# Patient Record
Sex: MALE | Race: WHITE | Employment: FULL TIME | URBAN - METROPOLITAN AREA
[De-identification: names, ages, dates, MRNs, and addresses within clinical notes are randomized per-mention and may not be internally consistent; named-entity substitution may affect disease eponyms.]

---

## 2021-03-29 NOTE — PROGRESS NOTES
[facility]  [address], [address]  Phone: [phone]  Fax: [phone]        INITIAL CONSULTATION      HISTORY OF PRESENT ILLNESS:  Amirah Abdul is a 41 y.o. male whom is referred from [facility] pain management secondary to diffuse widespread spinal pain radiating into the bilateral shoulder blades and RUE to the wrist and RLE to the knee. He rates his pain 5-10/10. Pt had onset of lower back pain x 2001 and neck pain x 2011. Onset of neck pain correlated with a cervical vertebrae compression fracture dx by chiropractor. Pt previously tolerated Neurontin 200 mg QD. He discontinued the Neurontin due to improvement in his pain. He has treated with Prednisone x 2 (most recent 1/2021). Pt previously underwent cervical spinal injections with benefit. Pt previously underwent  lumbar blocks. Most recent lumbar block was in 10/2020 by Dr. Paula Bee. Pt reports current right rotator cuff tear. Patient denies previous spinal surgery or recent physical therapy/chiropractic care. He is not on a HEP. Pt reports his weight has fluctuated between 215-235 within 1 week. Pt denies fever or skin changes. Pt was previously followed by [facility] pain management. Pt is a smoker. PmHx of right hip labral repair, cirrhosis of the liver. Note from Ahmet Daltonma dated 4/25/2019 indicating patient was seen with c/o low back pain, neck pain, shoulder pain. Pt underwent lumbar epidural 4/14/2019 with 50% relief. Treated with Mobic and Flexeril. Pt underwent cervical epidural with 50% relief. C spine MRI dated 10/15/2018 films not independently reviewed. Per report, there is mild to moderate broad based posterior disc osteophyte complex with is asymmetrically prominent on the right. The disc osteophyte complex is most promininently demonstrated in the righ tlateral to far lateral plane.  There is effacement of cerebral spinal fluid anterior to the spinal cord and lateral to the spinal cord and lateral to the spinal cord on the right due to the disc osteophyte complex. There is no convincing evidence of spinal cord deformation. There is severe right neural foraminal narrowing due to the disc osteophyte complex. there is suspected contact of the exiting right C5 nerve root. Left neural foramina is patent. L spine MRI dated 10/15/2018 films not independently reviewed. Per report, there is a small broad based disc bulge noted int he left paracentral plane there is mild indentation of the anterior aspect of the thecal sac on the left. There is mild left lateral recess narrowing without evidence of contact of the transversing left L2 nerve root. L2-3: there is a mild broad based posterior disc bulge. There is superimposed focal disc bulge noted in the left paracentral plane which extends superiorly. There is indentation on the anterior aspect sac. There is left lateral recess narrowing with suspected contact of the transvering left L3 nerve root. The right lateral recess is mildly narrowed due to a broad based posterior disc bulge. There is no evidence of contact of the transversing right L3 nerve root.  reviewed. Body mass index is 34.67 kg/m². PCP: UNKNOWN    Past Medical History:   Diagnosis Date    Cirrhosis of liver ([address]     GERD (gastroesophageal reflux disease)    HX O  Past Surgical History:   Procedure Laterality Date    HAND/FINGER SURGERY UNLISTED      HX CHOLECYSTECTOMY      HX OTHER SURGICAL      right hip labral repair    HX VASECTOMY     THER SURGICAL      right hip labral repair    HX VASECTOMY          Tobacco Use    Smoking status: Current Every Day Smoker     Packs/day: 1.00    Smokeless tobacco: Never Used   Substance Use Topics    Alcohol use: Not Currently       Work status: The patient is employed. Marital status: .          No Known Allergies         Family History   Problem Relation Age of Onset    Diabetes Father     Stroke Father     Cancer Brother          REVIEW OF SYSTEMS  Constitutional symptoms: Negative  Eyes: Negative  Ears, Nose, Throat, and Mouth: Negative  Cardiovascular: Negative  Respiratory: Negative  Genitourinary: Negative  Integumentary (Skin and/or breast): Negative  Musculoskeletal: Positive for diffuse widespread pain. Extremities: Negative for edema. Endocrine/Rheumatologic: Negative  Hematologic/Lymphatic: Negative  Allergic/Immunologic: Negative  Psychiatric: Negative       PHYSICAL EXAMINATION  Visit Vitals  /86 (BP 1 Location: Left upper arm)   Pulse 69   Temp 98.3 °F (36.8 °C)   Resp 18   Ht 5' 8\" (1.727 m)   Wt 228 lb (103.4 kg)   SpO2 99%   BMI 34.67 kg/m²       CONSTITUTIONAL: NAD, A&O x 3  HEART: Regular rate and rhythm  GASTROINTESTINAL: Positive bowel sounds, soft, nontender, and nondistended  LUNGS: Clear to auscultation bilaterally. SKIN: Negative for rash. RANGE OF MOTION: The patient has full passive range of motion in all four extremities. SENSATION: Decreased sensation to light touch on the 1st digit of the LUE. Otherwise, sensation is intact to light touch throughout. MOTOR:   Straight Leg Raise: Negative, bilateral  Martell: Negative, bilateral  Tandem Gait: Neg. Deep tendon reflexes are 0 at the biceps, triceps, and brachioradialis bilaterally. Deep tendon reflexes are 1 at the knees bilaterally and 0 on the RLE and 1 on the LLE at the ankles. Shoulder AB/Flex Elbow Flex Wrist Ext Elbow Ext Wrist Flex Hand Intrin Tone   Right +4/5 +4/5 +4/5 +4/5 +4/5 +4/5 +4/5   Left +4/5 +4/5 +4/5 +4/5 +4/5 +4/5 +4/5              Hip Flex Knee Ext Knee Flex Ankle DF GTE Ankle PF Tone   Right +4/5 +4/5 +4/5 +4/5 +4/5 +4/5 +4/5   Left +4/5 +4/5 +4/5 +4/5 +4/5 +4/5 +4/5     RADIOGRAPHS  Cervical spine plain films dated 3/30/2021. 2 views: AP and lateral. Revealed:  Mild disc space narrowing at C5-6. Small anterior osteophytes noted at C3, C4, C5 and C6. No malalignment. No acute pathology identified. Thoracic spine plain films dated 3/30/2021. 2 views: AP and lateral. Revealed:  Bridging osteophytes noted particularly in the mid to lower thoracic spine. Mild to moderate disc space narrowing at several levels. Exagerted kyphosis. No acute pathology identified. Lumbar spine plain films dated 3/30/2021. 2 views: AP and lateral. Revealed:  Mild disc space narrowing at L1-2, L2-3 and L5-S1. No malalignment. No acute pathology identified. ASSESSMENT   Diagnoses and all orders for this visit:    1. Neck pain  -     AMB POC XRAY, SPINE, CERVICAL; 2 OR 3    2. Thoracic spine pain  -     AMB POC XRAY, SPINE; THORACIC, 2 VIEW    3. Low back pain at multiple sites  -     AMB POC XRAY, SPINE, LUMBOSACRAL; 2 O    4. Cervical spondylosis without myelopathy    5. Cervical neuritis    6. DDD (degenerative disc disease), cervical    7. Thoracic spondylosis without myelopathy    8. DDD (degenerative disc disease), thoracic    9. Lumbar neuritis    10. DDD (degenerative disc disease), lumbar    Administrations This Visit     ketorolac (TORADOL) injection 30 mg     Admin Date  03/30/2021  11:50 Action  Given Dose  30 mg Route  IntraMUSCular Site  Right Gluteus Lenin Administered By  Tatiana Langford LPN    NDC: 43286-955-55    Patient Supplied?: No           Admin Date  03/30/2021  11:51 Action  Given Dose  30 mg Route  IntraMUSCular Site  Right Gluteus Lenin Administered By  Tatiana Langford LPN    NDC: 58420-900-32    Patient Supplied?: No                     IMPRESSIONS/RECOMMENDATIONS:  Patient presents today with c/o diffuse widespread spinal pain radiating into the bilateral shoulder blades and RUE to the wrist and RLE to the knee. Multiple treatment options were discussed. Pt is not interested in surgical intervention at this time. Pt received an in office Toradol injection. I will restart him on Neurontin 100 mg TID. Patient advised to call the office if intolerant to medication.  I will refer him to physical therapy with an emphasis on HEP. I will have the patient sign a release of medical information to obtain records from Dr. Keith Bond. Patient is neurologically intact. I will see the patient back in 6 week's time or earlier if needed. Written by Glo Roman, as dictated by Michelle Holt MD  I examined the patient, reviewed and agree with the note.

## 2021-03-30 ENCOUNTER — OFFICE VISIT (OUTPATIENT)
Dept: ORTHOPEDIC SURGERY | Age: 46
End: 2021-03-30
Payer: COMMERCIAL

## 2021-03-30 VITALS
HEART RATE: 69 BPM | RESPIRATION RATE: 18 BRPM | WEIGHT: 228 LBS | DIASTOLIC BLOOD PRESSURE: 86 MMHG | TEMPERATURE: 98.3 F | SYSTOLIC BLOOD PRESSURE: 126 MMHG | HEIGHT: 68 IN | BODY MASS INDEX: 34.56 KG/M2 | OXYGEN SATURATION: 99 %

## 2021-03-30 DIAGNOSIS — M47.812 CERVICAL SPONDYLOSIS WITHOUT MYELOPATHY: ICD-10-CM

## 2021-03-30 DIAGNOSIS — M54.6 THORACIC SPINE PAIN: ICD-10-CM

## 2021-03-30 DIAGNOSIS — M47.814 THORACIC SPONDYLOSIS WITHOUT MYELOPATHY: ICD-10-CM

## 2021-03-30 DIAGNOSIS — M51.36 DDD (DEGENERATIVE DISC DISEASE), LUMBAR: ICD-10-CM

## 2021-03-30 DIAGNOSIS — M50.30 DDD (DEGENERATIVE DISC DISEASE), CERVICAL: ICD-10-CM

## 2021-03-30 DIAGNOSIS — M54.16 LUMBAR NEURITIS: ICD-10-CM

## 2021-03-30 DIAGNOSIS — M54.2 NECK PAIN: Primary | ICD-10-CM

## 2021-03-30 DIAGNOSIS — M51.34 DDD (DEGENERATIVE DISC DISEASE), THORACIC: ICD-10-CM

## 2021-03-30 DIAGNOSIS — M54.50 LOW BACK PAIN AT MULTIPLE SITES: ICD-10-CM

## 2021-03-30 DIAGNOSIS — M54.12 CERVICAL NEURITIS: ICD-10-CM

## 2021-03-30 PROCEDURE — 72070 X-RAY EXAM THORAC SPINE 2VWS: CPT | Performed by: PHYSICAL MEDICINE & REHABILITATION

## 2021-03-30 PROCEDURE — 99204 OFFICE O/P NEW MOD 45 MIN: CPT | Performed by: PHYSICAL MEDICINE & REHABILITATION

## 2021-03-30 PROCEDURE — 96372 THER/PROPH/DIAG INJ SC/IM: CPT | Performed by: PHYSICAL MEDICINE & REHABILITATION

## 2021-03-30 PROCEDURE — 72040 X-RAY EXAM NECK SPINE 2-3 VW: CPT | Performed by: PHYSICAL MEDICINE & REHABILITATION

## 2021-03-30 PROCEDURE — 72100 X-RAY EXAM L-S SPINE 2/3 VWS: CPT | Performed by: PHYSICAL MEDICINE & REHABILITATION

## 2021-03-30 RX ORDER — KETOROLAC TROMETHAMINE 30 MG/ML
30 INJECTION, SOLUTION INTRAMUSCULAR; INTRAVENOUS
Status: COMPLETED | OUTPATIENT
Start: 2021-03-30 | End: 2021-03-30

## 2021-03-30 RX ORDER — GABAPENTIN 100 MG/1
100 CAPSULE ORAL 3 TIMES DAILY
Qty: 90 CAP | Refills: 1 | Status: SHIPPED | OUTPATIENT
Start: 2021-03-30 | End: 2021-05-11 | Stop reason: ALTCHOICE

## 2021-03-30 RX ORDER — OMEPRAZOLE 40 MG/1
40 CAPSULE, DELAYED RELEASE ORAL DAILY
COMMUNITY

## 2021-03-30 RX ADMIN — KETOROLAC TROMETHAMINE 30 MG: 30 INJECTION, SOLUTION INTRAMUSCULAR; INTRAVENOUS at 11:51

## 2021-03-30 RX ADMIN — KETOROLAC TROMETHAMINE 30 MG: 30 INJECTION, SOLUTION INTRAMUSCULAR; INTRAVENOUS at 11:50

## 2021-04-09 ENCOUNTER — HOSPITAL ENCOUNTER (OUTPATIENT)
Dept: PHYSICAL THERAPY | Age: 46
Discharge: HOME | End: 2021-04-09
Attending: PHYSICAL MEDICINE & REHABILITATION
Payer: COMMERCIAL

## 2021-04-09 PROCEDURE — 97110 THERAPEUTIC EXERCISES: CPT | Performed by: PHYSICAL THERAPIST

## 2021-04-09 PROCEDURE — 97140 MANUAL THERAPY 1/> REGIONS: CPT | Performed by: PHYSICAL THERAPIST

## 2021-04-09 PROCEDURE — 97162 PT EVAL MOD COMPLEX 30 MIN: CPT | Performed by: PHYSICAL THERAPIST

## 2021-04-09 NOTE — PROGRESS NOTES
[facility] - [facility]              [address]        [place], [address]   [phone] fax    Plan of Care/ Statement of Necessity for Physical Therapy Services  Patient name: Chance Gomez Start of Care: 2021   Referral source: Deja Sams MD : 1975    Medical Diagnosis: Cervicalgia [M54.2]  Low back pain [M54.5]  Pain in thoracic spine [M54.6]  Payor: Gaurang Oconnor / Plan: [address] [place] [address] / Product Type: PPO /  Onset Date:2021    Treatment Diagnosis: Neck Pain   Prior Hospitalization: see medical history Provider#: [id number]   Medications: Verified on Patient summary List    Comorbidities: Arthritis, Back Pain, BMI 31.6, GI Disease, Headaches, HBP, Sleep dysfunction. 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 The Plan of Care and following information is based on the information from the initial evaluation. Assessment/ key information: Patient with signs and symptoms consistent with acute exacerbation of chronic neck  and back pain. At this point, his neck symptoms are the worst.  He notes constant pain in the neck and right>left shoulder blade area. His AROM of the C/S is significantly decreased. He has pain with all AROM. Fuctionally, he notes pain prevents lifting and carrying. Patient will benefit from a program of skilled physical therapy to include therapeutic exercises to address strength deficits, therapeutic activities to improve functional mobility, neuromuscular reeducation to address balance, coordination and proprioception, manual therapy to address ROM and tissue extensibility and modalities as indicated. All questions were answered.     Evaluation Complexity History MEDIUM  Complexity : 1-2 comorbidities / personal factors will impact the outcome/ POC ; Examination MEDIUM Complexity : 3 Standardized tests and measures addressing body structure, function, activity limitation and / or participation in recreation  ;Presentation MEDIUM Complexity : Evolving with changing characteristics  ; Clinical Decision Making MEDIUM Complexity : FOTO score of 26-74  Overall Complexity Rating: MEDIUM  Problem List: pain affecting function, decrease ROM, decrease ADL/ functional abilitiies, decrease activity tolerance and decrease flexibility/ joint mobility   Treatment Plan may include any combination of the following: Therapeutic exercise, Therapeutic activities, Neuromuscular re-education, Physical agent/modality and Manual therapy  Patient / Family readiness to learn indicated by: asking questions, trying to perform skills and interest  Persons(s) to be included in education: patient (P)  Barriers to Learning/Limitations: None  Patient Goal (s): If I could get to a place I can manage it\"\"I'd like to become more active  Patient Self Reported Health Status: poor  Rehabilitation Potential: fair    Short Term Goals: To be accomplished in 1 weeks:  1. Patient will become proficient in their HEP and will be compliant in performing that program.  Evaluation:   Patient given a written/illustrated HEP. Long Term Goals: To be accomplished in 4 weeks:  1. Patient's pain level will be 3-4/10 with activity in order to improve patient's ability to perform normal ADLs. Evaluation:  5/10-8/10.  2. Patient will demonstrate cervical flexion 15, extension 30, right side bend 25, left side bend 25, right rotation 50, left rotation 50 AROM to increase ease of ADLs. Evaluation:  AROM C/S flex 5; extension; 20; Right SB 20; Left SB 20; Right Rotation 42; Left Rotation 43 with pain on rotation, extension and flexion and right SB. 3. Patient will increase FOTO score to 57 to indicate increased functional mobility. Evaluation:  50  4. Patient report little to no difficulty with moderate activities in order to improve ADLs.   Evaluation:  Notes this is limited a lot.    Frequency / Duration: Patient to be seen 2 times per week for 4 weeks. Patient/ Caregiver education and instruction: Diagnosis, prognosis, exercises   [x]  Plan of care has been reviewed with MEGAN Paez, PT 4/9/2021 12:53 PM  ________________________________________________________________________    I certify that the above Therapy Services are being furnished while the patient is under my care. I agree with the treatment plan and certify that this therapy is necessary.     [de-identified] Signature:____________Date:_________TIME:________     Ezequiel Tsai MD  ** Signature, Date and Time must be completed for valid certification **  Please sign and return to [facility] - [address], [address] [place]   [phone] fax

## 2021-04-09 NOTE — PROGRESS NOTES
PT DAILY TREATMENT NOTE/LUMBAR EVAL     Patient Name: Napoleon Alvarado  Date:2021  : 1975  [x]  Patient  Verified  Payor: BLUE CROSS / Plan: [address] [place] [address] / Product Type: PPO /    In time:1:13  Out time:2:00  Total Treatment Time (min): 47  Visit #: 1 of 8    Medicare/BCBS Only   Total Timed Codes (min):  23 1:1 Treatment Time:  47     Treatment Area: Cervicalgia [M54.2]  Low back pain [M54.5]  Pain in thoracic spine [M54.6]  SUBJECTIVE  Pain Level (0-10 scale): 5/10 now; 5/10 at best; 8/10 at worst.  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 Limitations to PLOF: Neck is limiting activity, difficulty standing, sitting carrying groceries. Mechanism of Injury: Patient with h/o chronic neck and back pain. September last year he noted some flare ups. Worsened in March. Current symptoms/Complaints: Constant pain in the neck and right shoulder/shoulder blade. Radiates into both shoulder blades and the spine now. Lower back pain is constant but no too bad currently. Will radiate down the right leg. His CC at this time is his neck and shoulder pain. When symptoms are bad he has difficulty with putting on and taking off his shirt. Numbness, pins and needles right foot. Previous Treatment/Compliance: Back injection 2020. PMHx/Surgical Hx: No spine surgeries. 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 Pt Goals:  \"If I could get to a place I can manage it\"\"I'd like to become more active\"  Barriers: [x]pain []financial []time []transportation []other  Cognition: A & O x 3 Other: OBJECTIVE/EXAMINATION  Domestic Life: Lives with his wife. Activity/Recreational Limitations: Limited activity level due to pain. Mobility: ambulates without deviation. Self Care: Independent. 24 min [x]Eval                  []Re-Eval       10 min Therapeutic Exercise:  [] See flow sheet :Emphasis on increasing AROM and strength. Rationale: increase ROM and increase strength to improve the patients ability to increase his functional activity level. 13 min Manual Therapy:  C/S LAD; Side glide, grade I-II, manual stretching UT, SOR. The manual therapy interventions were performed at a separate and distinct time from the therapeutic activities interventions. Rationale: decrease pain, increase ROM and increase tissue extensibility to increase ease of motion to improve function. With   [] TE   [] TA   [] neuro   [] other: Patient Education: [x] Review HEP    [] Progressed/Changed HEP based on:   [] positioning   [] body mechanics   [] transfers   [] heat/ice application    [] other:      Other Objective/Functional Measures:     Physical Therapy Evaluation - Lumbar Spine ([facility])    SUBJECTIVE  Symptoms:  Aggravated by:   [] Bending [x] Sitting [] Standing [] Walking   [] Moving [] Cough [] Sneeze [] Valsalva   [] AM  [] PM  Lying:  [] sup   [] pro   [] sidelying   [] Other: driving, looking over his shoulder. Eased by:    [] Bending [] Sitting [x] Standing [] Walking   [] Moving [] AM  [] PM  Lying: [] sup  [] pro  [] sidelying   [] Other: Injections     Diagnostic Tests: [] Lab work [] X-rays    [] CT [] MRI     [] Other:  Results: Cervical spine plain films dated 3/30/2021. 2 views: AP and lateral. Revealed:  Mild disc space narrowing at C5-6. Small anterior osteophytes noted at C3, C4, C5 and C6. No malalignment. No acute pathology identified.      Thoracic spine plain films dated 3/30/2021. 2 views: AP and lateral. Revealed:  Bridging osteophytes noted particularly in the mid to lower thoracic spine. Mild to moderate disc space narrowing at several levels. Exagerted kyphosis. No acute pathology identified.      Lumbar spine plain films dated 3/30/2021. 2 views: AP and lateral. Revealed:  Mild disc space narrowing at L1-2, L2-3 and L5-S1. No malalignment. No acute pathology identified. OBJECTIVE  Posture:  Lateral Shift: [x] R    [x] L     [] +  [x] -  Kyphosis: [x] Increased [] Decreased   []  WNL  Lordosis:  [] Increased [] Decreased   [x] WNL  Pelvic symmetry: [x] WNL    [] Other:  Head Position: Mild forward head posture. Shoulder/Scapular Position:   C-Lordosis:      []? increased   [x]? decreased  T-Kyphosis:     [x]? increased   []? decreased     Gait:  [x] Normal     [] Abnormal:    Active Movements: [] N/A   [] Too acute   [] Other:  ROM % AROM  limitation  Comments:pain, area   Forward flexion 40-60 50%  Tightness noted with all motions. Provoked   Extension 20-30 WNL  Mid back/Thoracic spine pain. SB right 20-30 25%     SB left 20-30 25%     Rotation right 5-10 25%     Rotation left 5-10 25%       Active Movements: []? N/A   []? Too acute   []? Other:  ROM AROM  degrees  Comments:pain, area   Forward flexion 5     Pain at the base of the occiput   Extension 20     no pain    SB right 20     Pain left lateral C/S   SB left   17    No pain   Rotation right 42     pain   Rotation left 43     pain      Thoracic Spine: []? N/A    []? WNL   [x]? Other: T/S flex: limited 25%  Ext limited 100%  Right and Left SB limited 50%  Right rotation limited 50%  Left Rotation limited 25%  Pain with all T/S motion.     Dural Mobility:  SLR Sitting: [] R    [] L    [] +    [x] -  @ (degrees):           Supine: [] R    [] L    [] +    [x] -  @ (degrees):   Slump Test: [x] R    [] L    [x] +    [] -  @ (degrees):   Prone Knee Bend: [] R    [] L    [] +    [x] -     Palpation  [] Min  [x] Mod  [] Severe    Location:  Right UT  [] Min  [x] Mod  [] Severe    Location: Right lateral C/S    Strength   L(0-5) R (0-5) N/T   Hip Flexion (L1,2) 4+ 4+ []   Knee Extension (L3,4) 4+ 4+ []   Ankle Dorsiflexion (L4) 4+ 4+ []   Great Toe Extension (L5)   [x]   Ankle Plantarflexion (S1) 4+ 4+ []   Knee Flexion (S1,2) 4+ 4+ []   Hip Extension (S1,2) 4 4 []   Hip abduction (L5) 4 4 []      []      []      []      []     Neuro Screen (myotome/dematome/felexes): []? WNL  Myotome Level Muscle Test Myotome Level Muscle Test   C5 Shoulder Adduction - Deltoid C8 Finger Flexors   C6 Wrist Extension T1 Finger Abduction - Interossei   C7 Elbow Extension       Comments: C4-T1 without deficits. Special Tests  Lumbar:  Iliolumbar Ligament Test: [x] Pos  [] Neg Right leg provokes left sided pain. Crests: level in standing    Mobility: Standing flex: Not able to flex to properly assess. Etha Cheyenne:  [x] R    [] L    [] +    [] - provokes left lower back pain    Hamstrings 90/90:  Left 45 degrees; 50 degrees.         Special Tests:  Cervical:        Spurling's:              []? R    []? L    []? +    []? - NT       Distraction:             []? R    []? L    [x]? +    []? -       Compression:         []? R    []? L    []? +    []? - NT     Pain Level (0-10 scale) post treatment: 4-5    ASSESSMENT/Changes in Function: Patient with signs and symptoms consistent with acute exacerbation of chronic neck  and back pain. At this point, his neck symptoms are the worst.  He notes constant pain in the neck and right>left shoulder blade area. His AROM of the C/S is significantly decreased. He has pain with all AROM.   Fuctionally, he notes pain prevents lifting and carrying.       Patient will continue to benefit from skilled PT services to modify and progress therapeutic interventions, address functional mobility deficits, address ROM deficits, analyze and address soft tissue restrictions, analyze and cue movement patterns, analyze and modify body mechanics/ergonomics and assess and modify postural abnormalities to attain remaining goals. [x]  See Plan of Care  []  See progress note/recertification  []  See Discharge Summary         Progress towards goals / Updated goals:  Short Term Goals: To be accomplished in 1 weeks:  1. Patient will become proficient in their HEP and will be compliant in performing that program.  Evaluation:   Patient given a written/illustrated HEP.     Long Term Goals: To be accomplished in 4 weeks:  1. Patient's pain level will be 3-4/10 with activity in order to improve patient's ability to perform normal ADLs. Evaluation:  5/10-8/10.  2. Patient will demonstrate cervical flexion 15, extension 30, right side bend 25, left side bend 25, right rotation 50, left rotation 50 AROM to increase ease of ADLs. Evaluation:  AROM C/S flex 5; extension; 20; Right SB 20; Left SB 20; Right Rotation 42; Left Rotation 43 with pain on rotation, extension and flexion and right SB. 3. Patient will increase FOTO score to 57 to indicate increased functional mobility. Evaluation:  50  4. Patient report little to no difficulty with moderate activities in order to improve ADLs. Evaluation:  Notes this is limited a lot.     PLAN  [x]  Upgrade activities as tolerated     [x]  Continue plan of care  []  Update interventions per flow sheet       []  Discharge due to:_  []  Other:_      Julee Marc, PT 4/9/2021  1:02 PM

## 2021-04-13 ENCOUNTER — HOSPITAL ENCOUNTER (OUTPATIENT)
Dept: PHYSICAL THERAPY | Age: 46
Discharge: HOME | End: 2021-04-13
Attending: PHYSICAL MEDICINE & REHABILITATION
Payer: COMMERCIAL

## 2021-04-13 PROCEDURE — 97140 MANUAL THERAPY 1/> REGIONS: CPT

## 2021-04-13 PROCEDURE — 97110 THERAPEUTIC EXERCISES: CPT

## 2021-04-13 NOTE — PROGRESS NOTES
PT DAILY TREATMENT NOTE     Patient Name: Sole Allen  Date:2021  : 1975  [x]  Patient  Verified  Payor: BLUE CROSS / Plan: [address] [place] [address] / Product Type: PPO /    In time:12:32P  Out time:1:28P  Total Treatment Time (min): 56  Visit #: 2 of 8    Medicare/BCBS Only   Total Timed Codes (min):  46 1:1 Treatment Time:  38       Treatment Area: Cervicalgia [M54.2]  Low back pain [M54.5]  Pain in thoracic spine [M54.6]    SUBJECTIVE  Pain Level (0-10 scale): 6   Any medication changes, allergies to medications, adverse drug reactions, diagnosis change, or new procedure performed?: [x] No    [] Yes (see summary sheet for update)  Subjective functional status/changes:   [] No changes reported  Patient reports relief of symptoms that lasted for 2 days following IE, however arrives to today's session with increased pain levels of c/s. Reports full compliance with prescribed HEP with no issues to report.      OBJECTIVE    Modality rationale: decrease pain and increase tissue extensibility to improve the patients ability to sleep comfortably   Min Type Additional Details    [] Estim:  []Unatt       []IFC  []Premod                        []Other:  []w/ice   []w/heat  Position:  Location:    [] Estim: []Att    []TENS instruct  []NMES                    []Other:  []w/US   []w/ice   []w/heat  Position:  Location:    []  Traction: [] Cervical       []Lumbar                       [] Prone          []Supine                       []Intermittent   []Continuous Lbs:  [] before manual  [] after manual    []  Ultrasound: []Continuous   [] Pulsed                           []1MHz   []3MHz W/cm2:  Location:    []  Iontophoresis with dexamethasone         Location: [] Take home patch   [] In clinic   10 []  Ice     [x]  heat  []  Ice massage  []  Laser   []  Anodyne Position: supine w/ wedge under B LE  Location: neck and low back    []  Laser with stim  []  Other:  Position:  Location:    [] Vasopneumatic Device Pressure:       [] lo [] med [] hi   Temperature: [] lo [] med [] hi       36 min Therapeutic Exercise:  [x]? See flow sheet :Emphasis on increasing AROM and strength. Rationale: increase ROM and increase strength to improve the patients ability to increase his functional activity level.     10 min Manual Therapy:  C/S LAD; Side glide, grade I-II, manual stretching UT,Pec Min SOR; STM/TPr to cervical paraspinals and B UT/LS and Pec Minors   The manual therapy interventions were performed at a separate and distinct time from the therapeutic activities interventions. Rationale: decrease pain, increase ROM and increase tissue extensibility to increase ease of motion to improve function. With   [x] TE   [] TA   [] neuro   [] other: Patient Education: [x] Review HEP    [] Progressed/Changed HEP based on:   [] positioning   [] body mechanics   [] transfers   [] heat/ice application    [] other:      Pain Level (0-10 scale) post treatment: 5    ASSESSMENT/Changes in Function:   Initiated session with MT techniques listed above followed by introduction of mobility activities. Significant cuing required throughout session to keep activities in tolerable pain free ranges, optimize technique and perform desired rep range. Limitations to session include pt preoccupied with business meeting he was participating in while attempting to participate in session. Patient will continue to benefit from skilled PT services to modify and progress therapeutic interventions, address functional mobility deficits, address ROM deficits, analyze and address soft tissue restrictions, analyze and cue movement patterns, analyze and modify body mechanics/ergonomics and assess and modify postural abnormalities to attain remaining goals.      [x]  See Plan of Care  []  See progress note/recertification  []  See Discharge Summary         Progress towards goals / Updated goals:    Short Term Goals: To be accomplished in 1 weeks:  1.  Patient will become proficient in their HEP and will be compliant in performing that program.  Evaluation:   Patient given a written/illustrated HEP. Current: Progressing: Pt reports full compliance, however demonstrates limited proficiency at this time, 04/13/21     [address] [facility] be accomplished in 4 weeks:  1. Patient's pain level will be 3-4/10 with activity in order to improve patient's ability to perform normal ADLs. Evaluation:  5/10-8/10.  2. Patient will demonstrate cervical flexion 15, extension 30, right side bend 25, left side bend 25, right rotation 50, left rotation 50 AROM to increase ease of ADLs. Evaluation:  AROM C/S flex 5; extension; 20; Right SB 20; Left SB 20; Right Rotation 42; Left Rotation 43 with pain on rotation, extension and flexion and right SB. 3. Patient will increase FOTO score to 57 to indicate increased functional mobility. Evaluation:  50  4. Patient report little to no difficulty with moderate activities in order to improve ADLs. Evaluation:  Notes this is limited a lot.     PLAN  [x]  Upgrade activities as tolerated     [x]  Continue plan of care  []  Update interventions per flow sheet       []  Discharge due to:_  []  Other:_      Shemar Finch DPT 4/13/2021  10:07 AM    Future Appointments   Date Time Provider Laureano Myrick   4/13/2021 12:30 PM Jonas Mclain DPT [facility]   4/19/2021  2:45 PM Jonas Mclain DPT [facility]   4/22/2021  1:15 PM Jonas Mclain DPT [facility]   4/27/2021 12:30 PM Oleg Ibarra, IRAM [facility]   4/29/2021 12:30 PM Jonas Mclain DPT [facility]   5/3/2021  2:00 PM Oleg Ibarra PT [facility]   5/6/2021  1:15 PM Tripp Benito PT [facility]   5/11/2021  8:50 AM Shelly Miller MD [facility]

## 2021-04-19 ENCOUNTER — HOSPITAL ENCOUNTER (OUTPATIENT)
Dept: PHYSICAL THERAPY | Age: 46
Discharge: HOME | End: 2021-04-19
Attending: PHYSICAL MEDICINE & REHABILITATION
Payer: COMMERCIAL

## 2021-04-19 PROCEDURE — 97140 MANUAL THERAPY 1/> REGIONS: CPT

## 2021-04-19 PROCEDURE — 97110 THERAPEUTIC EXERCISES: CPT

## 2021-04-19 NOTE — PROGRESS NOTES
PT DAILY TREATMENT NOTE 11    Patient Name: Ya Comment  Date:2021  : 1975  [x]  Patient  Verified  Payor: P[employer] LOAN / Plan: [address] [place] [address] / Product Type: PPO /    In time:2:45P  Out time: 3:44P  Total Treatment Time (min): 59  Visit #: 3 of 8    Medicare/BCBS Only   Total Timed Codes (min):  49 1:1 Treatment Time:  49       Treatment Area: Cervicalgia [M54.2]  Low back pain [M54.5]  Pain in thoracic spine [M54.6]    SUBJECTIVE  Pain Level (0-10 scale): 4-5  Any medication changes, allergies to medications, adverse drug reactions, diagnosis change, or new procedure performed?: [x] No    [] Yes (see summary sheet for update)  Subjective functional status/changes:   [] No changes reported  Patient reports further improvements in overall pain levels following previous session, stating symptoms did not return again until late last night/early morning.       OBJECTIVE    Modality rationale: decrease pain and increase tissue extensibility to improve the patients ability to sleep comfortably   Min Type Additional Details    [] Estim:  []Unatt       []IFC  []Premod                        []Other:  []w/ice   []w/heat  Position:  Location:    [] Estim: []Att    []TENS instruct  []NMES                    []Other:  []w/US   []w/ice   []w/heat  Position:  Location:    []  Traction: [] Cervical       []Lumbar                       [] Prone          []Supine                       []Intermittent   []Continuous Lbs:  [] before manual  [] after manual    []  Ultrasound: []Continuous   [] Pulsed                           []1MHz   []3MHz W/cm2:  Location:    []  Iontophoresis with dexamethasone         Location: [] Take home patch   [] In clinic   10 []  Ice     [x]  heat  []  Ice massage  []  Laser   []  Anodyne Position: supine w/ wedge under B LE  Location: neck and mid back/R Shld blade    []  Laser with stim  []  Other:  Position:  Location:    []  Vasopneumatic Device Pressure:       [] lo [] med [] hi   Temperature: [] lo [] med [] hi       37 min Therapeutic Exercise:  [x]? See flow sheet :Emphasis on increasing AROM and strength. Rationale: increase ROM and increase strength to improve the patients ability to increase his functional activity level.     12 min Manual Therapy:  C/S LAD; Side glide, grade II-III, manual stretching UT,Pec Min SOR; STM/TPr to cervical paraspinals and B UT/LS, infra/supraspinatus and Pec Minors   The manual therapy interventions were performed at a separate and distinct time from the therapeutic activities interventions. Rationale: decrease pain, increase ROM and increase tissue extensibility to increase ease of motion to improve function. With   [x] TE   [] TA   [] neuro   [] other: Patient Education: [x] Review HEP    [] Progressed/Changed HEP based on:   [] positioning   [] body mechanics   [] transfers   [] heat/ice application    [] other:      Pain Level (0-10 scale) post treatment: 4    ASSESSMENT/Changes in Function:   Initiated session with MT techniques listed above followed by progression of POC through added scapular strengthening and cervical mobility activities. Occasional verbal/tactile cuing required to optimize technique - in particular to decrease B UT over compensation. Leaves session with report of a decrease in pain levels. Patient will continue to benefit from skilled PT services to modify and progress therapeutic interventions, address functional mobility deficits, address ROM deficits, analyze and address soft tissue restrictions, analyze and cue movement patterns, analyze and modify body mechanics/ergonomics and assess and modify postural abnormalities to attain remaining goals.      [x]  See Plan of Care  []  See progress note/recertification  []  See Discharge Summary         Progress towards goals / Updated goals:    Short Term Goals: To be accomplished in 1 weeks:  1.  Patient will become proficient in their HEP and will be compliant in performing that program.  Evaluation:   Patient given a written/illustrated HEP. Current: Goal Met, reports/demonstrates full compliance/proficiency with HEP, 21     Long Term Goals: To be accomplished in 4 weeks:  1. Patient's pain level will be 3-4/10 with activity in order to improve patient's ability to perform normal ADLs. Evaluation:  5/10-8/10.  2. Patient will demonstrate cervical flexion 15, extension 30, right side bend 25, left side bend 25, right rotation 50, left rotation 50 AROM to increase ease of ADLs. Evaluation:  AROM C/S flex 5; extension; 20; Right SB 20; Left SB 20; Right Rotation 42; Left Rotation 43 with pain on rotation, extension and flexion and right SB. 3. Patient will increase FOTO score to 57 to indicate increased functional mobility. Evaluation:  50  4. Patient report little to no difficulty with moderate activities in order to improve ADLs. Evaluation:  Notes this is limited a lot.     PLAN  [x]  Upgrade activities as tolerated     [x]  Continue plan of care  []  Update interventions per flow sheet       []  Discharge due to:_  []  Other:_      Benjamin Maradiaga DPT 2021  10:07 AM    Future Appointments   Date Time Provider Laureano Myrick   2021  2:45 PM Ana Wilson DPT [facility]   2021  1:15 PM Ana Wilson DPT [facility]   2021 12:30 PM Valeri Brennan, PT [facility]   2021 12:30 PM [facility]   5/3/2021  2:00 PM Valeri Brennan, PT [facility]   2021  1:15 PM Candy Lewis PT [facility]   2021  8:50 AM Tracie Miller MD [facility]

## 2021-04-22 ENCOUNTER — HOSPITAL ENCOUNTER (OUTPATIENT)
Dept: PHYSICAL THERAPY | Age: 46
Discharge: HOME | End: 2021-04-22
Attending: PHYSICAL MEDICINE & REHABILITATION
Payer: COMMERCIAL

## 2021-04-22 PROCEDURE — 97140 MANUAL THERAPY 1/> REGIONS: CPT

## 2021-04-22 PROCEDURE — 97110 THERAPEUTIC EXERCISES: CPT

## 2021-04-22 NOTE — PROGRESS NOTES
PT DAILY TREATMENT NOTE     Patient Name: David Morgan  Date:2021  : 1975  [x]  Patient  Verified  Payor: [employer] [place] / Plan: [address] [place] [address] / Product Type: PPO /    In time:1:21P  Out time: 2:22P  Total Treatment Time (min): 61  Visit #: 4 of 8    Medicare/BCBS Only   Total Timed Codes (min): 61 1:1 Treatment Time:  61       Treatment Area: Cervicalgia [M54.2]  Low back pain [M54.5]  Pain in thoracic spine [M54.6]    SUBJECTIVE  Pain Level (0-10 scale): 5  Any medication changes, allergies to medications, adverse drug reactions, diagnosis change, or new procedure performed?: [x] No    [] Yes (see summary sheet for update)  Subjective functional status/changes:   [] No changes reported  Patient reports consistent reduction of symptoms following previous session, w/ report of experiencing flare up earlier this morning correlating this to increased stress. OBJECTIVE    50 min Therapeutic Exercise:  [x]? See flow sheet :Emphasis on increasing AROM and strength. Rationale: increase ROM and increase strength to improve the patients ability to increase his functional activity level.     11 min Manual Therapy:  C/S LAD; Side glide, grade II-III, manual stretching UT,Pec Min SOR; STM/TPr to cervical paraspinals and B UT/LS, infra/supraspinatus and Pec Minors   The manual therapy interventions were performed at a separate and distinct time from the therapeutic activities interventions. Rationale: decrease pain, increase ROM and increase tissue extensibility to increase ease of motion to improve function.          With   [x] TE   [] TA   [] neuro   [] other: Patient Education: [x] Review HEP    [] Progressed/Changed HEP based on:   [] positioning   [] body mechanics   [] transfers   [] heat/ice application    [x] other: updated HEP w/ new print out and TB issued w/ review      Pain Level (0-10 scale) post treatment: 4    ASSESSMENT/Changes in Function:     Initiated session with MT techniques listed above followed by progression of POC through added cervical mobility activities and increased resistance of activities performed over previous sessions. Occasional verbal/tactile cuing required to optimize technique - in particular to decrease B UT over compensation, however pt demonstrates improvements in self correcting in comparison to previous sessions. Leaves session with report of a decrease in pain levels. Patient will continue to benefit from skilled PT services to modify and progress therapeutic interventions, address functional mobility deficits, address ROM deficits, analyze and address soft tissue restrictions, analyze and cue movement patterns, analyze and modify body mechanics/ergonomics and assess and modify postural abnormalities to attain remaining goals. [x]  See Plan of Care  []  See progress note/recertification  []  See Discharge Summary         Progress towards goals / Updated goals:    Short Term Goals: To be accomplished in 1 weeks:  1.  Patient will become proficient in their HEP and will be compliant in performing that program.  Evaluation:   Patient given a written/illustrated HEP. Current: Goal Met, reports/demonstrates full compliance/proficiency with HEP, 04/19/21     Long Term Goals: To be accomplished in 4 weeks:  1. Patient's pain level will be 3-4/10 with activity in order to improve patient's ability to perform normal ADLs. Evaluation:  5/10-8/10.  2. Patient will demonstrate cervical flexion 15, extension 30, right side bend 25, left side bend 25, right rotation 50, left rotation 50 AROM to increase ease of ADLs. Evaluation:  AROM C/S flex 5; extension; 20; Right SB 20; Left SB 20; Right Rotation 42; Left Rotation 43 with pain on rotation, extension and flexion and right SB. 3. Patient will increase FOTO score to 57 to indicate increased functional mobility. Evaluation:  50  4.  Patient report little to no difficulty with moderate activities in order to improve ADLs. Evaluation:  Notes this is limited a lot.     PLAN  [x]  Upgrade activities as tolerated     [x]  Continue plan of care  []  Update interventions per flow sheet       []  Discharge due to:_  []  Other:_      Luz Maria Rich DPT 4/22/2021  10:07 AM    Future Appointments   Date Time Provider Laureano Caballeroi   4/22/2021  1:15 PM Hesham Kennedy DPT [facility]   4/27/2021 12:30 PM Jossue Rea, PT [facility]   4/29/2021 12:30 PM Hesham Kennedy DPT [facility]   5/3/2021  2:00 PM Jossue Rea, PT [facility]   5/6/2021  1:15 PM Shashi Dick, IRAM [facility]   5/11/2021  8:50 AM Domenic Miller MD [facility]

## 2021-04-27 ENCOUNTER — HOSPITAL ENCOUNTER (OUTPATIENT)
Dept: PHYSICAL THERAPY | Age: 46
Discharge: HOME | End: 2021-04-27
Attending: PHYSICAL MEDICINE & REHABILITATION
Payer: COMMERCIAL

## 2021-04-27 PROCEDURE — 97110 THERAPEUTIC EXERCISES: CPT | Performed by: PHYSICAL THERAPIST

## 2021-04-27 PROCEDURE — 97140 MANUAL THERAPY 1/> REGIONS: CPT | Performed by: PHYSICAL THERAPIST

## 2021-04-27 NOTE — PROGRESS NOTES
PT DAILY TREATMENT NOTE     Patient Name: Andrade Alvarez  Date:2021  : 1975  [x]  Patient  Verified  Payor: Gail Pillai / Plan: [address] [place] [address] / Product Type: PPO /    In time:12:30  Out time:1:30  Total Treatment Time (min): 60  Visit #: 5 of 8    Medicare/BCBS Only   Total Timed Codes (min):  50 1:1 Treatment Time:  50       Treatment Area: Cervicalgia [M54.2]  Low back pain [M54.5]  Pain in thoracic spine [M54.6]    SUBJECTIVE  Pain Level (0-10 scale): 4  Any medication changes, allergies to medications, adverse drug reactions, diagnosis change, or new procedure performed?: [x] No    [] Yes (see summary sheet for update)  Subjective functional status/changes:   [] No changes reported  Patient reports he feels that his progress ebbs and flows. Sometimes he notes it is feeling better but then it will flare up again. Pain seems to be focused at the right shoulder blade. OBJECTIVE    Modality rationale: decrease pain to improve the patients ability to increase tolerance to activity.     Min Type Additional Details    [] Estim:  []Unatt       []IFC  []Premod                        []Other:  []w/ice   []w/heat  Position:  Location:    [] Estim: []Att    []TENS instruct  []NMES                    []Other:  []w/US   []w/ice   []w/heat  Position:  Location:    []  Traction: [] Cervical       []Lumbar                       [] Prone          []Supine                       []Intermittent   []Continuous Lbs:  [] before manual  [] after manual    []  Ultrasound: []Continuous   [] Pulsed                           []1MHz   []3MHz W/cm2:  Location:    []  Iontophoresis with dexamethasone         Location: [] Take home patch   [] In clinic   10 []  Ice     [x]  heat  []  Ice massage  []  Laser   []  Anodyne Position: supine  Location:neck and shoulders    []  Laser with stim  []  Other:  Position:  Location:    []  Vasopneumatic Device Pressure:       [] lo [] med [] hi   Temperature: [] lo [] med [] hi   [] Skin assessment post-treatment:  []intact []redness- no adverse reaction    []redness  adverse reaction:       40 min Therapeutic Exercise:  [] See flow sheet :Emphasis on increasing AROM and strength. Rationale: increase ROM and increase strength to improve the patients ability to increase his tolerance to functional ADLs. 10 min Manual Therapy:  C/S LAD; Side glide, grade II-III, manual stretching UT,Pec Min SOR; STM/TPr to cervical paraspinals and B UT/LS, infra/supraspinatus and Pec Minors. PA mobs in prone T/S. The manual therapy interventions were performed at a separate and distinct time from the therapeutic activities interventions. Rationale: decrease pain, increase ROM and increase tissue extensibility to increase ease of motion to improve function. With   [] TE   [] TA   [] neuro   [] other: Patient Education: [x] Review HEP    [] Progressed/Changed HEP based on:   [] positioning   [] body mechanics   [] transfers   [] heat/ice application    [] other:      Other Objective/Functional Measures: Hypomobility in the T/S with tenderness along the UT and T/S paraspinal muscles. Pain Level (0-10 scale) post treatment: 4    ASSESSMENT/Changes in Function: Patient continues with painful T/S, C/S with decreased functional mobility. Patient will continue to benefit from skilled PT services to modify and progress therapeutic interventions, address functional mobility deficits, address ROM deficits, analyze and address soft tissue restrictions, analyze and cue movement patterns, analyze and modify body mechanics/ergonomics and assess and modify postural abnormalities to attain remaining goals.      []  See Plan of Care  []  See progress note/recertification  []  See Discharge Summary         Progress towards goals / Updated goals:  Short Term Goals: To be accomplished in 1 weeks:  1.  Patient will become proficient in their HEP and will be compliant in performing that program.  Evaluation:   Patient given a written/illustrated HEP. Current: Goal Met, reports/demonstrates full compliance/proficiency with HEP, 04/19/21     Long Term Goals: To be accomplished in 4 weeks:  1. Patient's pain level will be 3-4/10 with activity in order to improve patient's ability to perform normal ADLs. Evaluation:  5/10-8/10. Current:  Over the last week pain is 4/10-5/10. 4/27/2021. Progressing. 2. Patient will demonstrate cervical flexion 15, extension 30, right side bend 25, left side bend 25, right rotation 50, left rotation 50 AROM to increase ease of ADLs. Evaluation:  AROM C/S flex 5; extension; 20; Right SB 20; Left SB 20; Right Rotation 42; Left Rotation 43 with pain on rotation, extension and flexion and right SB. 3. Patient will increase FOTO score to 57 to indicate increased functional mobility. Evaluation:  50  4. Patient report little to no difficulty with moderate activities in order to improve ADLs.   Evaluation:  Notes this is limited a lot.       PLAN  []  Upgrade activities as tolerated     []  Continue plan of care  []  Update interventions per flow sheet       []  Discharge due to:_  []  Other:_      Uche Rosales, PT 4/27/2021  12:32 PM    Future Appointments   Date Time Provider Laureano Myrick   4/29/2021 12:30 PM Robert Cerrato DPT [facility]   5/3/2021  2:00 PM Álvaro Encarnacion, PT [facility]   5/6/2021  1:15 PM Alisha Carter PT [facility]   5/11/2021  8:50 AM Shantelle Miller MD [facility]

## 2021-04-29 ENCOUNTER — HOSPITAL ENCOUNTER (OUTPATIENT)
Dept: PHYSICAL THERAPY | Age: 46
Discharge: HOME | End: 2021-04-29
Attending: PHYSICAL MEDICINE & REHABILITATION
Payer: COMMERCIAL

## 2021-04-29 PROCEDURE — 97140 MANUAL THERAPY 1/> REGIONS: CPT

## 2021-04-29 PROCEDURE — 97110 THERAPEUTIC EXERCISES: CPT

## 2021-04-29 PROCEDURE — 20561 NDL INSJ W/O NJX 3+ MUSC: CPT

## 2021-04-29 NOTE — PROGRESS NOTES
Request for use of Dry Needling/Intramuscular Manual Therapy  Patient: Mar[facility]     Referral Source: Sabino Lombard, MD  Diagnosis: Cervicalgia [M54.2]  Low back pain [M54.5]  Pain in thoracic spine [M54.6]      : 1975  Date of initial visit: 21   Attended visits: 6  Missed Visits: 0    Based on findings from the physical therapy examination and evaluation, the evaluating therapist believes the patientEduardo  would benefit from including Dry Needling as part of the plan of care. Dry needling is a treatment technique utilized in conjunction with other PT interventions to inactivate myofascial trigger points and the pain and dysfunction they cause. Dry Needling is an advanced procedure that requires additional training including greater than 54 hours of intensive course work. Physical Therapists at [address] [place] [address] are trained and/or certified through [employer] for their education. PROCEDURE:   Solid filament sterile needle (typically 0.3mm/30 gauge) inserted into a trigger point   Repeated movements inactivate the trigger points, taking 30-60 seconds per site   Typically consists of 1 dry needling session per week and a possible second treatment including muscle re-education, flexibility, strengthening and other manual techniques to facilitate the benefits of dry needling     BENEFITS:   Inactivation of trigger points   Decreased pain   Increased muscle length   Improved movement patterns   Restoration of function POTENTIAL RISKS:   Post-needling soreness   Infection   Bruising/bleeding   Penetration of a nerve   Pneumothorax   All treating PTs have been thoroughly educated in avoiding adverse reactions    If you agree with this recommendation, please sign this form and fax it to us at [phone].   If you have questions or concerns regarding dry needling or any other treatment we may be providing, please contact us at [phone]. Thank you for allowing us to assist in the care of your patient. Stephanie Rdz, CARLEY    4/29/2021 2:30 PM     NOTE TO PHYSICIAN:  PLEASE COMPLETE THE ORDERS BELOW AND   FAX TO [facility]: [phone]  If you are unable to process this request in 24 hours please contact our office:   [phone]    I have read the above request and AGREE to the recommendation of including dry needling as part of the plan of care.       Physicians signature: _________________________Date: _________Time:________

## 2021-04-29 NOTE — PROGRESS NOTES
PT DAILY TREATMENT NOTE     Patient Name: Jose Willard  Date:2021  : 1975  [x]  Patient  Verified  Payor: [employer] / Plan: [address] [place] [address] / Product Type: PPO /    In time:12:30P  Out time:1:10P  Total Treatment Time (min): 40  Visit #: 6 of 8    Medicare/BCBS Only   Total Timed Codes (min):  40 1:1 Treatment Time:  40       Treatment Area: Cervicalgia [M54.2]  Low back pain [M54.5]  Pain in thoracic spine [M54.6]    SUBJECTIVE  Pain Level (0-10 scale): 4  Any medication changes, allergies to medications, adverse drug reactions, diagnosis change, or new procedure performed?: [x] No    [] Yes (see summary sheet for update)  Subjective functional status/changes:   [] No changes reported  Patient reports a 40% improvement in overall condition since beginning skilled PT services, further stating continuing to have good and bad days. Continued difficulty getting comfortable while sleeping reported. OBJECTIVE    30 min Therapeutic Exercise:  [] See flow sheet :Emphasis on increasing AROM and strength. Rationale: increase ROM and increase strength to improve the patients ability to increase his tolerance to functional ADLs. 10 min Manual Therapy:   STM/TPr to cervical paraspinals and B UT/LS, infra/supraspinatus and Pec Minors while seated    The manual therapy interventions were performed at a separate and distinct time from the therapeutic activities interventions. Rationale: decrease pain, increase ROM and increase tissue extensibility to increase ease of motion to improve function. With   [] TE   [] TA   [] neuro   [] other: Patient Education: [x] Review HEP    [] Progressed/Changed HEP based on:   [] positioning   [] body mechanics   [] transfers   [] heat/ice application    [] other:      Pain Level (0-10 scale) post treatment: 4    ASSESSMENT/Changes in Function: Continues to present with significant TPs/soft tissue restrictions through R post shld. Disscussed and ed on benefit of DN to address deficicts. Pt would like to receive this service. MD DN request sent. Patient will continue to benefit from skilled PT services to modify and progress therapeutic interventions, address functional mobility deficits, address ROM deficits, analyze and address soft tissue restrictions, analyze and cue movement patterns, analyze and modify body mechanics/ergonomics and assess and modify postural abnormalities to attain remaining goals. [x]  See Plan of Care  []  See progress note/recertification  []  See Discharge Summary         Progress towards goals / Updated goals:  Short Term Goals: To be accomplished in 1 weeks:  1.  Patient will become proficient in their HEP and will be compliant in performing that program.  Evaluation:   Patient given a written/illustrated HEP. Current: Goal Met, reports/demonstrates full compliance/proficiency with HEP, 04/19/21     Long Term Goals: To be accomplished in 4 weeks:  1. Patient's pain level will be 3-4/10 with activity in order to improve patient's ability to perform normal ADLs. Evaluation:  5/10-8/10. Current:  Over the last week pain is 4/10-5/10. 4/27/2021. Progressing. 2. Patient will demonstrate cervical flexion 15, extension 30, right side bend 25, left side bend 25, right rotation 50, left rotation 50 AROM to increase ease of ADLs. Evaluation:  AROM C/S flex 5; extension; 20; Right SB 20; Left SB 20; Right Rotation 42; Left Rotation 43 with pain on rotation, extension and flexion and right SB. 3. Patient will increase FOTO score to 57 to indicate increased functional mobility. Evaluation:  50  4. Patient report little to no difficulty with moderate activities in order to improve ADLs.   Evaluation:  Notes this is limited a lot.       PLAN  [x]  Upgrade activities as tolerated     [x]  Continue plan of care  []  Update interventions per flow sheet       []  Discharge due to:_  [x]  Other:Introduce DN as discussed     Teresa Osborne DPT 4/29/2021  12:32 PM    Future Appointments   Date Time Provider Laureano Myrick   5/5/2021  2:45 PM Lenny Yip DPT [facility]   5/7/2021  1:15 PM Lily Freeman PT [facility]   5/10/2021  2:00 PM Sisi Martínez [facility]   5/11/2021  8:50 AM Anna Buenrostro MD [facility]   5/12/2021  2:00 PM Maggie Jade [facility]

## 2021-05-03 ENCOUNTER — APPOINTMENT (OUTPATIENT)
Dept: PHYSICAL THERAPY | Age: 46
End: 2021-05-03
Attending: PHYSICAL MEDICINE & REHABILITATION
Payer: COMMERCIAL

## 2021-05-05 ENCOUNTER — HOSPITAL ENCOUNTER (OUTPATIENT)
Dept: PHYSICAL THERAPY | Age: 46
Discharge: HOME | End: 2021-05-05
Attending: PHYSICAL MEDICINE & REHABILITATION
Payer: COMMERCIAL

## 2021-05-05 PROCEDURE — 97110 THERAPEUTIC EXERCISES: CPT

## 2021-05-05 PROCEDURE — 97140 MANUAL THERAPY 1/> REGIONS: CPT

## 2021-05-05 PROCEDURE — 97530 THERAPEUTIC ACTIVITIES: CPT

## 2021-05-05 NOTE — PROGRESS NOTES
[facility]              [address] [facility] [address] [facility]        [place], [address]   [phone] fax    Physician Update  [x] Progress Note  [] Discharge Summary  Patient name: Radha Oakley Start of Care: 2021   Referral source: Jose Elias Mccoy MD : 1975   Medical/Treatment Diagnosis: Cervicalgia [M54.2]  Low back pain [M54.5]  Pain in thoracic spine [M54.6]  Payor: Kathrin January / Plan: [address] [place] [address] / Product Type: PPO /  Onset Date:2021     Prior Hospitalization: see medical history Provider#: [id number]   Medications: Verified on Patient Summary List    Comorbidities: Arthritis, Back Pain, BMI 31.6, GI Disease, Headaches, HBP, Sleep dysfunction. 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 Reports improvements in overall pain intensity, however continued difficulty getting comfortable while sleeping. Demonstrates improvements in functional status (evident by improved FOTO score), however continues to demonstrate limitations w/ functional cervical AROM. Patient will continue to benefit from skilled PT services to modify and progress therapeutic interventions, address functional mobility deficits, address ROM deficits, analyze and address soft tissue restrictions, analyze and cue movement patterns, analyze and modify body mechanics/ergonomics and assess and modify postural abnormalities to attain remaining goals.       Progress towards Goals:   Short Term Goals: To be accomplished in 1 weeks:  1.  Patient will become proficient in their HEP and will be compliant in performing that program.  Evaluation:   Patient given a written/illustrated HEP. Current: Goal Met, reports/demonstrates full compliance/proficiency with HEP, 04/19/21     Long Term Goals: To be accomplished in 4 weeks:  1. Patient's pain level will be 3-4/10 with activity in order to improve patient's ability to perform normal ADLs. Evaluation:  5/10-8/10. Current:  Goal Met: Over the last week pain is 3-4/10, 05/05/21     2. Patient will demonstrate cervical flexion 15, extension 30, right side bend 25, left side bend 25, right rotation 50, left rotation 50 AROM to increase ease of ADLs. Evaluation:  AROM C/S flex 5; extension; 20; Right SB 20; Left SB 20; Right Rotation 42; Left Rotation 43 with pain on rotation, extension and flexion and right SB. Current: Progressing:  AROM C/S flex 18; extension; 22; Right SB 25; Left SB 20; Right Rotation 48; Left Rotation 45, 05/05/21     3. Patient will increase FOTO score to 57 to indicate increased functional mobility. Evaluation:  50  Current: Goal Met: 57, 05/05/21      4. Patient report little to no difficulty with moderate activities in order to improve ADLs. Evaluation:  Notes this is limited a lot. Current: Progressing: reports mild difficulty, 05/05/21      Goals: to be achieved in 4 weeks:  1. Patient's pain level will be 0-2/10 with activity in order to improve patient's ability to perform normal ADLs. Current: 3-4/10, 05/05/21   2. Patient will demonstrate cervical flexion 15, extension 30, right side bend 25, left side bend 25, right rotation 50, left rotation 50 AROM to increase ease of ADLs. Evaluation:  AROM C/S flex 5; extension; 20; Right SB 20; Left SB 20; Right Rotation 42; Left Rotation 43 with pain on rotation, extension and flexion and right SB. Current: Progressing:  AROM C/S flex 18; extension; 22; Right SB 25; Left SB 20; Right Rotation 48; Left Rotation 45, 05/05/21    3.  Patient will report ability to sleep comfortably w/ minimal to no sleep disturbances secondary to increase in symptoms in order to demonstrate a return to PLOF  Current: reports waking up often secondary to increase in symptoms, 05/05/21     4. Patient report little to no difficulty with moderate activities in order to improve ADLs. Evaluation:  Notes this is limited a lot. Current: Progressing: reports mild difficulty, 05/05/21      ASSESSMENT/RECOMMENDATIONS:  [x]Continue therapy per initial plan/protocol at a frequency of  2 x per week for 4 weeks  []Continue therapy with the following recommended changes:_____________________      _____________________________________________________________________  []Discontinue therapy progressing towards or have reached established goals  []Discontinue therapy due to lack of appreciable progress towards goals  []Discontinue therapy due to lack of attendance or compliance  []Await Physician's recommendations/decisions regarding therapy  []Other:________________________________________________________________    Thank you for this referral.    Severiano Fell, DPT 5/5/2021 4:45 PM    NOTE TO PHYSICIAN:  PLEASE COMPLETE THE ORDERS BELOW AND   FAX TO [facility]: [phone]  If you are unable to process this request in 24 hours please contact our office: [phone]    []  I have read the above report and request that my patient continue as recommended. []  I have read the above report and request that my patient continue therapy with the following changes/special instructions:________________________________________  []I have read the above report and request that my patient be discharged from therapy.     Physician's Signature:____________Date:_________TIME:________     Misa [place], MD  ** Signature, Date and Time must be completed for valid certification **

## 2021-05-05 NOTE — PROGRESS NOTES
PT DAILY TREATMENT NOTE     Patient Name: David Morgan  Date:2021  : 1975  [x]  Patient  Verified  Payor: [employer] / Plan: [address] [place] [address] / Product Type: PPO /    In time:2:43P  Out time:3:23P  Total Treatment Time (min): 40  Visit #: 7 of 8    Medicare/BCBS Only   Total Timed Codes (min):  40 1:1 Treatment Time:  40       Treatment Area: Cervicalgia [M54.2]  Low back pain [M54.5]  Pain in thoracic spine [M54.6]    SUBJECTIVE  Pain Level (0-10 scale): 3  Any medication changes, allergies to medications, adverse drug reactions, diagnosis change, or new procedure performed?: [x] No    [] Yes (see summary sheet for update)  Subjective functional status/changes:   [] No changes reported  Patient reports a 60% improvement in overall condition since beginning skilled PT services, further stating continuing to have good and bad days. Reports improvements in overall pain intensity, however continued difficulty getting comfortable while sleeping. OBJECTIVE    10 min Therapeutic Exercise:  [x] See flow sheet :Emphasis on increasing AROM and strength. Rationale: increase ROM and increase strength to improve the patients ability to increase his tolerance to functional ADLs. 15 min Therapeutic Activity:  []  See flow sheet : FOTO/Goal Reassessment    Rationale: increase ROM and increase strength  to improve the patients ability to return to PLOF    15 min Manual Therapy:   STM/TPr to R cervical paraspinals and R UT/LS, infra/supraspinatus and Pec Minors in L SL   The manual therapy interventions were performed at a separate and distinct time from the therapeutic activities interventions. Rationale: decrease pain, increase ROM and increase tissue extensibility to increase ease of motion to improve function.           With   [x] TE   [] TA   [] neuro   [] other: Patient Education: [x] Review HEP    [] Progressed/Changed HEP based on:   [] positioning   [] body mechanics   [] transfers   [] heat/ice application    [] other:      Pain Level (0-10 scale) post treatment: 4    ASSESSMENT/Changes in Function:   Patient reports a 60% improvement in overall condition since beginning skilled PT services, further stating continuing to have good and bad days. Reports improvements in overall pain intensity, however continued difficulty getting comfortable while sleeping. Demonstrates improvements in functional status (evident by improved FOTO score), however continues to demonstrate limitations w/ functional cervical AROM. Patient will continue to benefit from skilled PT services to modify and progress therapeutic interventions, address functional mobility deficits, address ROM deficits, analyze and address soft tissue restrictions, analyze and cue movement patterns, analyze and modify body mechanics/ergonomics and assess and modify postural abnormalities to attain remaining goals. []  See Plan of Care  [x]  See progress note/recertification  []  See Discharge Summary         Progress towards goals / Updated goals:  Short Term Goals: To be accomplished in 1 weeks:  1.  Patient will become proficient in their HEP and will be compliant in performing that program.  Evaluation:   Patient given a written/illustrated HEP. Current: Goal Met, reports/demonstrates full compliance/proficiency with HEP, 04/19/21     Long Term Goals: To be accomplished in 4 weeks:  1. Patient's pain level will be 3-4/10 with activity in order to improve patient's ability to perform normal ADLs. Evaluation:  5/10-8/10. Current:  Goal Met: Over the last week pain is 3-4/10, 05/05/21    2. Patient will demonstrate cervical flexion 15, extension 30, right side bend 25, left side bend 25, right rotation 50, left rotation 50 AROM to increase ease of ADLs.   Evaluation:  AROM C/S flex 5; extension; 20; Right SB 20; Left SB 20; Right Rotation 42; Left Rotation 43 with pain on rotation, extension and flexion and right SB.  Current: Progressing:  AROM C/S flex 18; extension; 22; Right SB 25; Left SB 20; Right Rotation 48; Left Rotation 45, 05/05/21    3. Patient will increase FOTO score to 57 to indicate increased functional mobility. Evaluation:  50  Current: Goal Met: 57, 05/05/21     4. Patient report little to no difficulty with moderate activities in order to improve ADLs. Evaluation:  Notes this is limited a lot.    Current: Progressing: reports mild difficulty, 05/05/21       PLAN  [x]  Upgrade activities as tolerated     [x]  Continue plan of care  []  Update interventions per flow sheet       []  Discharge due to:_  []  Other:I    Heber Hoffman DPT 5/5/2021  12:32 PM    Future Appointments   Date Time Provider Laureano Myrick   5/7/2021  1:15 PM Sly Marks, PT [facility]   5/10/2021  2:00 PM Yakelin Sanchez DPT [facility]   5/11/2021  8:50 AM Sabino Lombard, MD [facility]   5/12/2021  2:00 PM Claribel Valdez [facility]

## 2021-05-06 ENCOUNTER — APPOINTMENT (OUTPATIENT)
Dept: PHYSICAL THERAPY | Age: 46
End: 2021-05-06
Attending: PHYSICAL MEDICINE & REHABILITATION
Payer: COMMERCIAL

## 2021-05-07 ENCOUNTER — HOSPITAL ENCOUNTER (OUTPATIENT)
Dept: PHYSICAL THERAPY | Age: 46
Discharge: HOME | End: 2021-05-07
Attending: PHYSICAL MEDICINE & REHABILITATION
Payer: COMMERCIAL

## 2021-05-07 PROCEDURE — 97110 THERAPEUTIC EXERCISES: CPT

## 2021-05-07 PROCEDURE — 97140 MANUAL THERAPY 1/> REGIONS: CPT

## 2021-05-07 NOTE — PROGRESS NOTES
PT DAILY TREATMENT NOTE     Patient Name: Susan Wang  Date:2021  : 1975  [x]  Patient  Verified  Payor: [employer] / Plan: [address] [place] [address] / Product Type: PPO /    In time:115  Out time:214  Total Treatment Time (min): 61  Visit #: 1 of 8    Medicare/BCBS Only   Total Timed Codes (min):  59 1:1 Treatment Time:  59       Treatment Area: Cervicalgia [M54.2]  Low back pain [M54.5]  Pain in thoracic spine [M54.6]    SUBJECTIVE  Pain Level (0-10 scale): 3  Any medication changes, allergies to medications, adverse drug reactions, diagnosis change, or new procedure performed?: [x] No    [] Yes (see summary sheet for update)  Subjective functional status/changes:   [] No changes reported  Patient reports significant symptom relief after last treatment session with completion of trigger point release. \"This is probably the best it has been in a long time. \"    OBJECTIVE    44 min Therapeutic Exercise:  [x] See flow sheet : Emphasis placed on improving available spinal and extremity ROM and strength   Rationale: increase ROM and increase strength to improve the patients ability to improve ease with functional ADLs    15 min Manual Therapy:    Supine, Left C2 Lateral Grade III-IV Mobilization (neutral)  Supine, Right Suboccipital STM  Supine, Right C0/C1 Distraction  Seated, Right C0/C1 Manipulation   The manual therapy interventions were performed at a separate and distinct time from the therapeutic activities interventions.   Rationale: decrease pain, increase ROM and increase tissue extensibility to improve ease with recreation          With   [] TE   [] TA   [] neuro   [] other: Patient Education: [x] Review HEP    [] Progressed/Changed HEP based on:   [] positioning   [] body mechanics   [] transfers   [] heat/ice application    [] other:      Other Objective/Functional Measures: (+) Right Cervical Flexion Rotation      Pain Level (0-10 scale) post treatment: 3    ASSESSMENT/Changes in Function: With (+) right cervical flexion rotation test with reproduction of right suboccipital pain with cervical flexion and right rotation AROM with associated pain reproduction. With progression of upper thoracic mobility exercises with poor spinal kinesthetic awareness noted. Patient will continue to benefit from skilled PT services to modify and progress therapeutic interventions, address functional mobility deficits, address ROM deficits, address strength deficits, analyze and address soft tissue restrictions, analyze and cue movement patterns, analyze and modify body mechanics/ergonomics and assess and modify postural abnormalities to attain remaining goals. []  See Plan of Care  []  See progress note/recertification  []  See Discharge Summary         Progress towards goals / Updated goals:    Short Term Goals: To be accomplished in 1 weeks:  1.  Patient will become proficient in their HEP and will be compliant in performing that program.  At PN: Goal Met, reports/demonstrates full compliance/proficiency with HEP, 04/19/21     Long Term Goals: To be accomplished in 4 weeks:  1. Patient's pain level will be 3-4/10 with activity in order to improve patient's ability to perform normal ADLs. At PN:  Goal Met: Over the last week pain is 3-4/10, 05/05/21  2. Patient will demonstrate cervical flexion 15, extension 30, right side bend 25, left side bend 25, right rotation 50, left rotation 50 AROM to increase ease of ADLs. At PN: Progressing:  AROM C/S flex 18; extension; 22; Right SB 25; Left SB 20; Right Rotation 48; Left Rotation 45, 05/05/21  3. Patient will increase FOTO score to 57 to indicate increased functional mobility. At PN: Goal Met: 57, 05/05/21   4. Patient report little to no difficulty with moderate activities in order to improve ADLs.   At PN: Progressing: reports mild difficulty, 05/05/21       PLAN  [x]  Upgrade activities as tolerated     [x]  Continue plan of care  []  Update interventions per flow sheet       []  Discharge due to:_  []  Other:_      Da Elizabeth, PT 5/7/2021  7:51 AM    Future Appointments   Date Time Provider Laureano Myrick   5/7/2021  1:15 PM Mike Rivers0 N Alia Zimmerman [facility]   5/10/2021  2:00 PM [facility]   5/11/2021  8:50 AM Jose Elias Mccoy MD [facility]   5/12/2021  2:00 PM Feng Olivas [facility]

## 2021-05-07 NOTE — PROGRESS NOTES
[facility]  [address], [address] [place]   Phone: [phone]  Fax: [phone]        PROGRESS NOTE      HISTORY OF PRESENT ILLNESS:  The patient is a 41 y.o. male and was seen today for follow up of diffuse widespread spinal pain radiating into the bilateral shoulder blades and RUE to the wrist and RLE to the knee. Pt had onset of lower back pain x 2001 and neck pain x 2011. Onset of neck pain correlated with a cervical vertebrae compression fracture dx by chiropractor. Pt previously tolerated Neurontin 200 mg QD. He discontinued the Neurontin due to improvement in his pain. He has treated with Prednisone x 2 (most recent 1/2021). Pt previously underwent cervical spinal injections with benefit. Pt previously underwent  lumbar blocks. Most recent lumbar block was in 10/2020 by Dr. Jenn Link. Pt reports current right rotator cuff tear. Patient denies previous spinal surgery or recent physical therapy/chiropractic care. He is not on a HEP. Pt reports his weight has fluctuated between 215-235 within 1 week. Pt denies fever or skin changes. Pt was previously followed by [facility] pain management. Pt is a smoker. PmHx of right hip labral tear (repaired), cirrhosis of the liver. Note from Jose Michael dated 4/25/2019 indicating patient was seen with c/o low back pain, neck pain, shoulder pain. Pt underwent lumbar epidural 4/14/2019 with 50% relief. Treated with Mobic and Flexeril. Pt underwent cervical epidural with 50% relief. C spine MRI dated 10/15/2018 films not independently reviewed. Per report, there is mild to moderate broad based posterior disc osteophyte complex with is asymmetrically prominent on the right. The disc osteophyte complex is most promininently demonstrated in the righ tlateral to far lateral plane.  There is effacement of cerebral spinal fluid anterior to the spinal cord and lateral to the spinal cord and lateral to the spinal cord on the right due to the disc osteophyte complex. There is no convincing evidence of spinal cord deformation. There is severe right neural foraminal narrowing due to the disc osteophyte complex. there is suspected contact of the exiting right C5 nerve root. Left neural foramina is patent. L spine MRI dated 10/15/2018 films not independently reviewed. Per report, there is a small broad based disc bulge noted int he left paracentral plane there is mild indentation of the anterior aspect of the thecal sac on the left. There is mild left lateral recess narrowing without evidence of contact of the transversing left L2 nerve root. L2-3: there is a mild broad based posterior disc bulge. There is superimposed focal disc bulge noted in the left paracentral plane which extends superiorly. There is indentation on the anterior aspect sac. There is left lateral recess narrowing with suspected contact of the transvering left L3 nerve root. The right lateral recess is mildly narrowed due to a broad based posterior disc bulge. There is no evidence of contact of the transversing right L3 nerve root. Cervical spine plain films dated 3/30/2021. 2 views: AP and lateral. Revealed: Mild disc space narrowing at C5-6. Small anterior osteophytes noted at C3, C4, C5 and C6. No malalignment. No acute pathology identified. Thoracic spine plain films dated 3/30/2021. 2 views: AP and lateral. Revealed: Bridging osteophytes noted particularly in the mid to lower thoracic spine. Mild to moderate disc space narrowing at several levels. Exagerted kyphosis. No acute pathology identified. Lumbar spine plain films dated 3/30/2021. 2 views: AP and lateral. Revealed: Mild disc space narrowing at L1-2, L2-3 and L5-S1. No malalignment. No acute pathology identified. At his last clinical appointment, pt was not interested in surgical intervention at the time. Pt received an in office Toradol injection. I restarted him on Neurontin 100 mg TID.  I referred him to physical therapy with an emphasis on HEP. I had the patient sign a release of medical information to obtain records from Dr. Jonel Multani.       The patient returns today with neck pain radiating into the right shoulder and right-sided lower back pain. He rates his neck pain 4/10 back pain 3/10, previously 5-10/10. Pt reports improvement in the RLE radicular symptoms. His low back pain is worse in the morning. Therapy notes reviewed. Pt is currently enrolled in PT with benefit. Pt admits to taking the Neurontin 100 mg prn. I have not received the records from Dr. Jonel Multani as previously requested. Pt denies change in bowel or bladder habits. Pt denies dropping things or loss of balance.  reviewed. Body mass index is 33.3 kg/m².     PCP: Unknown (Inactive)      Past Medical History:   Diagnosis Date    Cirrhosis of liver ([facility] [address]     GERD (gastroesophageal reflux disease)         Social History     Socioeconomic History    Marital status:      Spouse name: Not on file    Number of children: Not on file    Years of education: Not on file    Highest education level: Not on file   Occupational History    Not on file   Social Needs    Financial resource strain: Not on file    Food insecurity     Worry: Not on file     Inability: Not on file    Transportation needs     Medical: Not on file     Non-medical: Not on file   Tobacco Use    Smoking status: Current Every Day Smoker     Packs/day: 1.00    Smokeless tobacco: Never Used   Substance and Sexual Activity    Alcohol use: Not Currently    Drug use: Never    Sexual activity: Not on file   Lifestyle    Physical activity     Days per week: Not on file     Minutes per session: Not on file    Stress: Not on file   Relationships    Social connections     Talks on phone: Not on file     Gets together: Not on file     Attends Taoist service: Not on file     Active member of club or organization: Not on file     Attends meetings of clubs or organizations: Not on file Relationship status: Not on file    Intimate partner violence     Fear of current or ex partner: Not on file     Emotionally abused: Not on file     Physically abused: Not on file     Forced sexual activity: Not on file   Other Topics Concern    Not on file   Social History Narrative    Not on file       Current Outpatient Medications   Medication Sig Dispense Refill    escitalopram oxalate (LEXAPRO) 10 mg tablet       melatonin 5 mg tablet Take 5 mg by mouth At bedtime.  multivitamin (ONE A DAY) tablet Take 1 Tab by mouth daily.  cholecalciferol (VITAMIN D3) 25 mcg (1,000 unit) cap Take 1,000 Units by mouth daily.  ibuprofen (MOTRIN) 800 mg tablet Take  by mouth.  acetaminophen (Tylenol Extra Strength) 500 mg tablet Take  by mouth every six (6) hours as needed for Pain.  omeprazole (PRILOSEC) 40 mg capsule Take 40 mg by mouth daily. No Known Allergies       PHYSICAL EXAMINATION    Visit Vitals  BP (!) 133/91 (BP 1 Location: Left upper arm)   Pulse 76   Temp 98.9 °F (37.2 °C)   Resp 18   Ht 5' 8\" (1.727 m)   Wt 219 lb (99.3 kg)   SpO2 98%   BMI 33.30 kg/m²       CONSTITUTIONAL: NAD, A&O x 3  SENSATION: Decreased sensation to light touch on the first digit of the LUE due to prior injury. Otherwise, intact to light touch throughout  NEURO: Bridgette's is negative bilaterally. RANGE OF MOTION: The patient has full passive range of motion in all four extremities. MOTOR:  Straight Leg Raise: Negative, bilateral     Shoulder AB/Flex Elbow Flex Wrist Ext Elbow Ext Wrist Flex Hand Intrin Tone   Right +4/5 +4/5 +4/5 +4/5 +4/5 +4/5 +4/5   Left +4/5 +4/5 +4/5 +4/5 +4/5 +4/5 +4/5              Hip Flex Knee Ext Knee Flex Ankle DF GTE Ankle PF Tone   Right +4/5 +4/5 +4/5 +4/5 +4/5 +4/5 +4/5   Left +4/5 +4/5 +4/5 +4/5 +4/5 +4/5 +4/5       ASSESSMENT   Diagnoses and all orders for this visit:    1. Neck pain    2. Thoracic spine pain    3. Low back pain at multiple sites    4.  Cervical spondylosis without myelopathy    5. Cervical neuritis    6. DDD (degenerative disc disease), cervical    7. Thoracic spondylosis without myelopathy    8. DDD (degenerative disc disease), thoracic    9. Lumbar neuritis    10. DDD (degenerative disc disease), lumbar        IMPRESSION AND PLAN:  Patient returns to the office today with c/o neck pain radiating into the right shoulder and right-sided lower back pain. Multiple treatment options were discussed. I informed the patient that Neurontin is a slow acting medication and is not intended to be taken as an as-needed medication. Pt wished to discontinue the Neurontin 100 mg. He has responded well to Therapy. He should continue with PT as prescribed and perform his HEP once completed. I again requested records from Dr. Deshaun Rueda and [facility] pain management. Patient is neurologically intact. I will see the patient back in 6 week's time or earlier if needed. Written by Maribel Marin, as dictated by Effie King MD  I examined the patient, reviewed and agree with the note.

## 2021-05-10 ENCOUNTER — HOSPITAL ENCOUNTER (OUTPATIENT)
Dept: PHYSICAL THERAPY | Age: 46
Discharge: HOME | End: 2021-05-10
Attending: PHYSICAL MEDICINE & REHABILITATION
Payer: COMMERCIAL

## 2021-05-10 PROCEDURE — 97140 MANUAL THERAPY 1/> REGIONS: CPT

## 2021-05-10 PROCEDURE — 97110 THERAPEUTIC EXERCISES: CPT

## 2021-05-10 NOTE — PROGRESS NOTES
PT DAILY TREATMENT NOTE     Patient Name: Jami Stringer  Date:5/10/2021  : 1975  [x]  Patient  Verified  Payor: BLUE CROSS / Plan: [address] [place] [address] / Product Type: PPO /    In time:2:00P  Out time:2:44P  Total Treatment Time (min): 44  Visit #: 2 of 8    Medicare/BCBS Only   Total Timed Codes (min):  44 1:1 Treatment Time:  44       Treatment Area: Cervicalgia [M54.2]  Low back pain [M54.5]  Pain in thoracic spine [M54.6]    SUBJECTIVE  Pain Level (0-10 scale): 3  Any medication changes, allergies to medications, adverse drug reactions, diagnosis change, or new procedure performed?: [x] No    [] Yes (see summary sheet for update)  Subjective functional status/changes:   [] No changes reported  Patient reports a signicant reduction in symptoms following 21 visit where TPr of post shld was performed. States he has been able to sleep more comfortably since. OBJECTIVE    29 min Therapeutic Exercise:  [x] See flow sheet :Emphasis on increasing AROM and strength. Rationale: increase ROM and increase strength to improve the patients ability to increase his tolerance to functional ADLs. 15 min Manual Therapy:  TPr/IC to R UT/LS, infra/supraspinatus, Pec Minor, Lat, Teres Major and Subscap in L SL   The manual therapy interventions were performed at a separate and distinct time from the therapeutic activities interventions. Rationale: decrease pain, increase ROM and increase tissue extensibility to increase ease of motion to improve function.           With   [x] TE   [] TA   [] neuro   [] other: Patient Education: [x] Review HEP    [] Progressed/Changed HEP based on:   [] positioning   [] body mechanics   [] transfers   [] heat/ice application    [] other:      Pain Level (0-10 scale) post treatment:2.5    ASSESSMENT/Changes in Function:   Continues to present with significant TPs/soft tissue restrictions through R post shld and pec - MT performed w/ improvements noted after. Progressed scap strengthening activities w/ pt demonstrating overall good tolerance with report of a decreased in symptoms after. [x]  See Plan of Care  []  See progress note/recertification  []  See Discharge Summary         Progress towards goals / Updated goals:  Short Term Goals: To be accomplished in 1 weeks:  1.  Patient will become proficient in their HEP and will be compliant in performing that program.  At PN: Goal Met, reports/demonstrates full compliance/proficiency with HEP, 04/19/21     Long Term Goals: To be accomplished in 4 weeks:  1. Patient's pain level will be 3-4/10 with activity in order to improve patient's ability to perform normal ADLs. At [facility] Met: Over the last week pain is 3-4/10, 05/05/21  2. Patient will demonstrate cervical flexion 15, extension 30, right side bend 25, left side bend 25, right rotation 50, left rotation 50 AROM to increase ease of ADLs. At PN: Progressing:  AROM C/S flex 18; extension; 22; Right SB 25; Left SB 20; Right Rotation 48; Left Rotation 45, 05/05/21  3. Patient will increase FOTO score to 57 to indicate increased functional mobility. At PN: Goal Met: 57, 05/05/21   4. Patient report little to no difficulty with moderate activities in order to improve ADLs. At PN: Progressing: reports mild difficulty, 05/05/21  5.  Patient will report ability to sleep comfortably w/ minimal to no sleep disturbances secondary to increase in symptoms in order to demonstrate a return to PLOF  P/N: reports waking up often secondary to increase in symptoms, 05/05/21  Current: Progressing: continues to sleep more comfortably with rolling less often d/t increase symptoms, 05/10/21       PLAN  [x]  Upgrade activities as tolerated     [x]  Continue plan of care  []  Update interventions per flow sheet       []  Discharge due to:_  []  Other:ARMANDO Rdz DPT 5/10/2021  12:32 PM    Future Appointments   Date Time Provider Laureano Myrick   5/11/2021  8:50 AM Sierra Angeles MD [facility]   5/12/2021  2:00 PM Ryan Antonio [facility]   5/19/2021  1:15 PM Yahaira Juventinojhonny [facility]   5/21/2021  1:15 PM Beverly Poole [place] [facility]   5/27/2021  2:45 PM Jazmine Roa DPT [facility]   5/28/2021  1:15 PM Beverly Poole, IRAM [facility]   6/2/2021  2:00 PM Beverly Poole [place] [facility]   6/4/2021 11:45 AM Laurent Rivers, PT [facility]

## 2021-05-11 ENCOUNTER — OFFICE VISIT (OUTPATIENT)
Dept: ORTHOPEDIC SURGERY | Age: 46
End: 2021-05-11
Payer: COMMERCIAL

## 2021-05-11 VITALS
TEMPERATURE: 98.9 F | RESPIRATION RATE: 18 BRPM | DIASTOLIC BLOOD PRESSURE: 91 MMHG | HEART RATE: 76 BPM | SYSTOLIC BLOOD PRESSURE: 133 MMHG | HEIGHT: 68 IN | WEIGHT: 219 LBS | OXYGEN SATURATION: 98 % | BODY MASS INDEX: 33.19 KG/M2

## 2021-05-11 DIAGNOSIS — M47.814 THORACIC SPONDYLOSIS WITHOUT MYELOPATHY: ICD-10-CM

## 2021-05-11 DIAGNOSIS — M47.812 CERVICAL SPONDYLOSIS WITHOUT MYELOPATHY: ICD-10-CM

## 2021-05-11 DIAGNOSIS — M51.36 DDD (DEGENERATIVE DISC DISEASE), LUMBAR: ICD-10-CM

## 2021-05-11 DIAGNOSIS — M50.30 DDD (DEGENERATIVE DISC DISEASE), CERVICAL: ICD-10-CM

## 2021-05-11 DIAGNOSIS — M54.50 LOW BACK PAIN AT MULTIPLE SITES: ICD-10-CM

## 2021-05-11 DIAGNOSIS — M54.12 CERVICAL NEURITIS: ICD-10-CM

## 2021-05-11 DIAGNOSIS — M54.2 NECK PAIN: Primary | ICD-10-CM

## 2021-05-11 DIAGNOSIS — M51.34 DDD (DEGENERATIVE DISC DISEASE), THORACIC: ICD-10-CM

## 2021-05-11 DIAGNOSIS — M54.6 THORACIC SPINE PAIN: ICD-10-CM

## 2021-05-11 DIAGNOSIS — M54.16 LUMBAR NEURITIS: ICD-10-CM

## 2021-05-11 PROCEDURE — 99213 OFFICE O/P EST LOW 20 MIN: CPT | Performed by: PHYSICAL MEDICINE & REHABILITATION

## 2021-05-11 RX ORDER — GLUCOSAMINE SULFATE 1500 MG
1000 POWDER IN PACKET (EA) ORAL DAILY
COMMUNITY

## 2021-05-11 RX ORDER — BISMUTH SUBSALICYLATE 262 MG
1 TABLET,CHEWABLE ORAL DAILY
COMMUNITY

## 2021-05-11 RX ORDER — CHOLECALCIFEROL (VITAMIN D3) 125 MCG
5 CAPSULE ORAL AT BEDTIME
COMMUNITY

## 2021-05-11 RX ORDER — IBUPROFEN 800 MG/1
TABLET ORAL
COMMUNITY

## 2021-05-11 RX ORDER — ACETAMINOPHEN 500 MG
TABLET ORAL
COMMUNITY
End: 2021-06-22 | Stop reason: ALTCHOICE

## 2021-05-11 RX ORDER — ESCITALOPRAM OXALATE 10 MG/1
TABLET ORAL
COMMUNITY
Start: 2021-02-26

## 2021-05-12 ENCOUNTER — APPOINTMENT (OUTPATIENT)
Dept: PHYSICAL THERAPY | Age: 46
End: 2021-05-12
Attending: PHYSICAL MEDICINE & REHABILITATION
Payer: COMMERCIAL

## 2021-05-19 ENCOUNTER — APPOINTMENT (OUTPATIENT)
Dept: PHYSICAL THERAPY | Age: 46
End: 2021-05-19
Attending: PHYSICAL MEDICINE & REHABILITATION
Payer: COMMERCIAL

## 2021-05-21 ENCOUNTER — APPOINTMENT (OUTPATIENT)
Dept: PHYSICAL THERAPY | Age: 46
End: 2021-05-21
Attending: PHYSICAL MEDICINE & REHABILITATION
Payer: COMMERCIAL

## 2021-05-27 ENCOUNTER — APPOINTMENT (OUTPATIENT)
Dept: PHYSICAL THERAPY | Age: 46
End: 2021-05-27
Attending: PHYSICAL MEDICINE & REHABILITATION
Payer: COMMERCIAL

## 2021-05-28 ENCOUNTER — HOSPITAL ENCOUNTER (OUTPATIENT)
Dept: PHYSICAL THERAPY | Age: 46
Discharge: HOME | End: 2021-05-28
Attending: PHYSICAL MEDICINE & REHABILITATION
Payer: COMMERCIAL

## 2021-05-28 PROCEDURE — 97140 MANUAL THERAPY 1/> REGIONS: CPT

## 2021-05-28 PROCEDURE — 97110 THERAPEUTIC EXERCISES: CPT

## 2021-05-28 NOTE — PROGRESS NOTES
PT DAILY TREATMENT NOTE     Patient Name: Susan Wang  Date:2021  : 1975  [x]  Patient  Verified  Payor: BLUE CROSS / Plan: [address] [place] [address] / Product Type: PPO /    In time:110  Out time:212  Total Treatment Time (min): 62  Visit #: 3 of 8    Medicare/BCBS Only   Total Timed Codes (min):  62 1:1 Treatment Time:  62       Treatment Area: Cervicalgia [M54.2]  Low back pain [M54.5]  Pain in thoracic spine [M54.6]    SUBJECTIVE  Pain Level (0-10 scale): 4  Any medication changes, allergies to medications, adverse drug reactions, diagnosis change, or new procedure performed?: [x] No    [] Yes (see summary sheet for update)  Subjective functional status/changes:   [] No changes reported  Patient reports sensation of right shoulder blade \"popping\" when attempting to put a cover on his truck bed last week with continued soreness. Patent reports non-attendance since 5/10 secondary to personal time constraints. OBJECTIVE    47 min Therapeutic Exercise:  [x]? See flow sheet : Emphasis placed on improving available spinal and extremity ROM and strength   Rationale: increase ROM and increase strength to improve the patients ability to improve ease with functional ADLs     15 min Manual Therapy:    Supine, Left C6-C7 Lateral Grade II-III Mobilization (neutral, left sidebend)  Supine, Cervical Manual Distraction (neutral, left sidebend)  Left Sidelying, Right Latissimus Dorsi Manual TrP Release  Left Sidelying, Right Scapular Lateral Block with Shoulder Abduction PROM   The manual therapy interventions were performed at a separate and distinct time from the therapeutic activities interventions.   Rationale: decrease pain, increase ROM and increase tissue extensibility to improve ease with recreation    With   [] TE   [] TA   [] neuro   [] other: Patient Education: [x] Review HEP    [] Progressed/Changed HEP based on:   [] positioning   [] body mechanics   [] transfers   [] heat/ice application    [] other:      Other Objective/Functional Measures:   Cervical AROM: Flexion AROM 30 deg, Extension AROM 30 deg, Left Sidebend 12 deg, Right Sidebend 15 deg, Left Rotation 45 deg, Right Rotation 45 deg     Pain Level (0-10 scale) post treatment: 3    ASSESSMENT/Changes in Function: With reproduction of right lateral scapular and right UE radicular symptoms with right lower cervical lateral glide with s/s consistent with right cervical radicular pain noted upon examination with relief with application of manual cervical distraction. With prominent muscular trigger points noted to right Latissimus dorsi with reproduction of concordant sign. Patient will continue to benefit from skilled PT services to modify and progress therapeutic interventions, address functional mobility deficits, address ROM deficits, address strength deficits, analyze and address soft tissue restrictions, analyze and cue movement patterns, analyze and modify body mechanics/ergonomics and assess and modify postural abnormalities to attain remaining goals. []  See Plan of Care  []  See progress note/recertification  []  See Discharge Summary         Progress towards goals / Updated goals:    Short Term Goals: To be accomplished in 1 weeks:  1.  Patient will become proficient in their HEP and will be compliant in performing that program.  At Last PN: Met, Reports/demonstrates full compliance/proficiency with HEP, 04/19/21     Long Term Goals: To be accomplished in 4 weeks:  1. Patient's pain level will be 3-4/10 with activity in order to improve patient's ability to perform normal ADLs. At Last PN: Met, Over the last week pain is 3-4/10, 05/05/21  2. Patient will demonstrate cervical flexion 15, extension 30, right side bend 25, left side bend 25, right rotation 50, left rotation 50 AROM to increase ease of ADLs.   At Last PN: Progressing:  AROM C/S F;exion 18; Extension; 22; Right SB 25; Left SB 20; Right Rotation 48; Left Rotation 45, 05/05/21  Current: Progressing, Flexion AROM 30 deg, Extension AROM 30 deg, Left Sidebend 12 deg, Right Sidebend 15 deg, Left Rotation 45 deg, Right Rotation 45 deg, 5/28/2021  3. Patient will increase FOTO score to 57 to indicate increased functional mobility. At Last PN: Met, FOTO = 57, 05/05/21   4. Patient report little to no difficulty with moderate activities in order to improve ADLs. At Last PN: Progressing, Reports mild difficulty, 05/05/21  5.  Patient will report ability to sleep comfortably w/ minimal to no sleep disturbances secondary to increase in symptoms in order to demonstrate a return to PLOF  At Last PN: Reports waking up often secondary to increase in symptoms, 05/05/21  Current: Progressing, Continues to sleep more comfortably with rolling less often d/t increase symptoms, 05/10/21    PLAN  [x]  Upgrade activities as tolerated     [x]  Continue plan of care  []  Update interventions per flow sheet       []  Discharge due to:_  []  Other:_      Ruben Garcias PT 5/28/2021  7:50 AM    Future Appointments   Date Time Provider Laureano Myrick   5/28/2021  1:15 PM Millie Christopher PT [facility]   6/2/2021  2:00 PM Saji Melvin [facility]   6/4/2021 11:45 AM Millie Christopher PT [facility]   6/22/2021  8:50 AM Michelle Miller MD [facility]

## 2021-06-02 ENCOUNTER — HOSPITAL ENCOUNTER (OUTPATIENT)
Dept: PHYSICAL THERAPY | Age: 46
Discharge: HOME | End: 2021-06-02
Attending: PHYSICAL MEDICINE & REHABILITATION
Payer: COMMERCIAL

## 2021-06-02 PROCEDURE — 97110 THERAPEUTIC EXERCISES: CPT

## 2021-06-02 PROCEDURE — 97140 MANUAL THERAPY 1/> REGIONS: CPT

## 2021-06-02 NOTE — PROGRESS NOTES
PT DAILY TREATMENT NOTE     Patient Name: Radha Oakley  Date:2021  : 1975  [x]  Patient  Verified  Payor: [employer] / Plan: [address] [place] [address] / Product Type: PPO /    In time:200  Out time:303  Total Treatment Time (min): 63  Visit #: 4 of 8    Medicare/BCBS Only   Total Timed Codes (min):  63 1:1 Treatment Time:  45       Treatment Area: Cervicalgia [M54.2]  Low back pain [M54.5]  Pain in thoracic spine [M54.6]    SUBJECTIVE  Pain Level (0-10 scale): 3-4  Any medication changes, allergies to medications, adverse drug reactions, diagnosis change, or new procedure performed?: [x] No    [] Yes (see summary sheet for update)  Subjective functional status/changes:   [] No changes reported  Patient reports symptoms are better today than last treatment session. OBJECTIVE    51 min Therapeutic Exercise:  [x]? ? See flow sheet : Emphasis placed on improving available spinal and extremity ROM and strength   Rationale: increase ROM and increase strength to improve the patients ability to improve ease with functional ADLs     12 min Manual Therapy:    Supine, Left C6-C7 Lateral Grade II-III Mobilization (neutral, left sidebend)  Supine, Cervical Manual Distraction (neutral, left sidebend)   The manual therapy interventions were performed at a separate and distinct time from the therapeutic activities interventions.   Rationale: decrease pain, increase ROM and increase tissue extensibility to improve ease with recreation     With   [] TE   [] TA   [] neuro   [] other: Patient Education: [x] Review HEP    [] Progressed/Changed HEP based on:   [] positioning   [] body mechanics   [] transfers   [] heat/ice application    [] other:      Other Objective/Functional Measures:    Cervical AROM: Without reproduction of right UE radicular symptoms with performance within available AROM    Pain Level (0-10 scale) post treatment: 3    ASSESSMENT/Changes in Function: With reduction in irritability of right UE radicular symptoms but with continued reproduction of right lower cervical lateral glide. Patient continues to demonstrate prominent restrictions in bilateral shoulder AROM with prominent thoracic hypomobility with extension ROM loss. Patient will continue to benefit from skilled PT services to modify and progress therapeutic interventions, address functional mobility deficits, address ROM deficits, address strength deficits, analyze and address soft tissue restrictions, analyze and cue movement patterns, analyze and modify body mechanics/ergonomics and assess and modify postural abnormalities to attain remaining goals. []  See Plan of Care  []  See progress note/recertification  []  See Discharge Summary         Progress towards goals / Updated goals:    Short Term Goals: To be accomplished in 1 weeks:  1.  Patient will become proficient in their HEP and will be compliant in performing that program.  At Last PN: Met, Reports/demonstrates full compliance/proficiency with HEP, 04/19/21     Long Term Goals: To be accomplished in 4 weeks:  1. Patient's pain level will be 3-4/10 with activity in order to improve patient's ability to perform normal ADLs. At Last PN: Met, Over the last week pain is 3-4/10, 05/05/21  2. Patient will demonstrate cervical flexion 15, extension 30, right side bend 25, left side bend 25, right rotation 50, left rotation 50 AROM to increase ease of ADLs. At Last PN: Progressing:  AROM C/S F;exion 18; Extension; 22; Right SB 25; Left SB 20; Right Rotation 48; Left Rotation 45, 05/05/21  Current: Progressing, Flexion AROM 30 deg, Extension AROM 30 deg, Left Sidebend 12 deg, Right Sidebend 15 deg, Left Rotation 45 deg, Right Rotation 45 deg, 5/28/2021  3. Patient will increase FOTO score to 57 to indicate increased functional mobility. At Last PN: Met, FOTO = 57, 05/05/21   4. Patient report little to no difficulty with moderate activities in order to improve ADLs.   At Last PN: Progressing, Reports mild difficulty, 05/05/21   Current: Remains, Mild difficulty with moderate activities, 6/2/2021  5.  Patient will report ability to sleep comfortably w/ minimal to no sleep disturbances secondary to increase in symptoms in order to demonstrate a return to PLOF  At Last PN: Reports waking up often secondary to increase in symptoms, 05/05/21  Current: Progressing, Moderate sleep disturbances reported, 6/2/2021    PLAN  [x]  Upgrade activities as tolerated     [x]  Continue plan of care  []  Update interventions per flow sheet       []  Discharge due to:_  []  Other:_      Mickie Carolina, PT 6/2/2021  8:37 AM    Future Appointments   Date Time Provider Laureano Myrick   6/2/2021  2:00 PM Saji Almendarez [facility]   6/4/2021 11:45 AM Alisha Carter PT [facility]   6/22/2021  8:50 AM Shantelle Miller MD [facility]

## 2021-06-04 ENCOUNTER — HOSPITAL ENCOUNTER (OUTPATIENT)
Dept: PHYSICAL THERAPY | Age: 46
End: 2021-06-04
Attending: PHYSICAL MEDICINE & REHABILITATION
Payer: COMMERCIAL

## 2021-06-07 ENCOUNTER — HOSPITAL ENCOUNTER (OUTPATIENT)
Dept: PHYSICAL THERAPY | Age: 46
Discharge: HOME | End: 2021-06-07
Attending: PHYSICAL MEDICINE & REHABILITATION
Payer: COMMERCIAL

## 2021-06-07 PROCEDURE — 97110 THERAPEUTIC EXERCISES: CPT

## 2021-06-07 PROCEDURE — 97140 MANUAL THERAPY 1/> REGIONS: CPT

## 2021-06-07 NOTE — PROGRESS NOTES
[facility]              [address] [facility] [place], [address]   [phone] fax    Progress Note  Patient name: Brody Mendes Start of Care: 2021   Referral source: Panchito Wheatley MD : 1975   Medical/Treatment Diagnosis: Cervicalgia [M54.2]  Low back pain [M54.5]  Pain in thoracic spine [M54.6]  Payor: Negritaon Kendrick / Plan: [address] [place] [address] / Product Type: PPO /  Onset Date:2021     Prior Hospitalization: see medical history Provider#: [id number]   Medications: Verified on Patient Summary List    Comorbidities: Arthritis, Back Pain, BMI 31.6, GI Disease, Headaches, HBP, Sleep dysfunction. 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  Patient will become proficient in their HEP and will be compliant in performing that program.  At Last PN: Met, Reports/demonstrates full compliance/proficiency with HEP      Long Term Goals: To be accomplished in 4 weeks:  1. Patient's pain level will be 3-4/10 with activity in order to improve patient's ability to perform normal ADLs. At Last PN: Met, Over the last week pain is 3-4/10  2. Patient will demonstrate cervical flexion 15, extension 30, right side bend 25, left side bend 25, right rotation 50, left rotation 50 AROM to increase ease of ADLs. At Last PN: Progressing:  AROM C/S F;exion 18; Extension; 22; Right SB 25; Left SB 20; Right Rotation 48; Left Rotation 45  Current: Progressing, Flexion AROM 15 deg, Extension AROM 18 deg, Left Sidebend 20 deg, Right Sidebend 25 deg, Left Rotation 40 deg, Right Rotation 42 deg  3. Patient will increase FOTO score to 57 to indicate increased functional mobility. At Last PN: Met, FOTO = 57  4.  Patient report little to no difficulty with moderate activities in order to improve ADLs. At Last PN: Progressing, Reports mild difficulty  Current: Remains, Mild difficulty with moderate activities  5. Patient will report ability to sleep comfortably w/ minimal to no sleep disturbances secondary to increase in symptoms in order to demonstrate a return to PLOF  At Last PN: Reports waking up often secondary to increase in symptoms  Current: Progressing, Moderate sleep disturbances reported         Key Functional Changes: See above goals. Updated Goals: Continue with unmet goals. ASSESSMENT/RECOMMENDATIONS:    Patient continues to have limited objective cervical AROM but demonstrates observationally greater available cervical PROM with manual assessment with normal end-feel at end-range. Patient continues to subjectively be limited with lifting, reaching, and other recreational and functionals ADLs secondary to cervical pain. Patient has moderate sleep disturbances secondary to increase in lower back pain. Patient will continue to benefit from skilled PT services to modify and progress therapeutic interventions, address functional mobility deficits, address ROM deficits, address strength deficits and analyze and address soft tissue restrictions to attain remaining goals.     [x]Continue therapy per initial plan/protocol at a frequency of  2 x per week for 4 weeks  []Continue therapy with the following recommended changes:_____________________      _____________________________________________________________________  []Discontinue therapy progressing towards or have reached established goals  []Discontinue therapy due to lack of appreciable progress towards goals  []Discontinue therapy due to lack of attendance or compliance  []Await Physician's recommendations/decisions regarding therapy  []Other:________________________________________________________________    Thank you for this referral.    Bret Logan, PTA 6/7/2021 11:07 AM  NOTE TO PHYSICIAN: PLEASE COMPLETE THE ORDERS BELOW AND   FAX TO [facility]: [phone]  If you are unable to process this request in 24 hours please contact our office: [phone]    []  I have read the above report and request that my patient continue as recommended. []  I have read the above report and request that my patient continue therapy with the following changes/special instructions:________________________________________  []I have read the above report and request that my patient be discharged from therapy.     Physician's Signature:____________Date:_________TIME:________     Dina Grover MD  ** Signature, Date and Time must be completed for valid certification **

## 2021-06-07 NOTE — PROGRESS NOTES
PT DAILY TREATMENT NOTE     Patient Name: Genny Horvath  Date:2021  : 1975  [x]  Patient  Verified  Payor: BLUE CROSS / Plan: [address] [place] [address] / Product Type: PPO /    In time:11:02  Out time:11:58  Total Treatment Time (min): 56  Visit #: 5 of 8    Medicare/BCBS Only   Total Timed Codes (min):  56 1:1 Treatment Time:  56       Treatment Area: Cervicalgia [M54.2]  Low back pain [M54.5]  Pain in thoracic spine [M54.6]    SUBJECTIVE  Pain Level (0-10 scale): 3-4  Any medication changes, allergies to medications, adverse drug reactions, diagnosis change, or new procedure performed?: [x] No    [] Yes (see summary sheet for update)  Subjective functional status/changes:   [] No changes reported  Patient reports that his back bothers him while sleeping at night. OBJECTIVE     46 min Therapeutic Exercise:  [x]? ?? See flow sheet : Emphasis placed on improving available spinal and extremity ROM and strength   Rationale: increase ROM and increase strength to improve the patients ability to improve ease with functional ADLs    10 min Manual Therapy:    Supine- cervical distraction  Supine- Cervical PA mobs grade I-II  Supine- Cervical lateral glides  Supine- Manual cervical stretching (flexion, SB, Rot)   The manual therapy interventions were performed at a separate and distinct time from the therapeutic activities interventions. Rationale: decrease pain, increase ROM, increase tissue extensibility and decrease trigger points to increase ease with overhead reaching.                With   [] TE   [] TA   [] neuro   [] other: Patient Education: [x] Review HEP    [] Progressed/Changed HEP based on:   [] positioning   [] body mechanics   [] transfers   [] heat/ice application    [] other:      Other Objective/Functional Measures: see goals     Pain Level (0-10 scale) post treatment: 4    ASSESSMENT/Changes in Function: Patient continues to have limited cervical active ROM, but has more mobility with PROM. Patient continues to be limited with lifting, reaching, and other ADLs due to cervical pain. Patient has moderate disturbances due to increase in lower back pain. Patient will continue to benefit from skilled PT services to modify and progress therapeutic interventions, address functional mobility deficits, address ROM deficits, address strength deficits and analyze and address soft tissue restrictions to attain remaining goals. []  See Plan of Care  [x]  See progress note/recertification  []  See Discharge Summary         Progress towards goals / Updated goals:  Short Term Goals: To be accomplished in 1 weeks:  1.  Patient will become proficient in their HEP and will be compliant in performing that program.  At Last PN: Met, Reports/demonstrates full compliance/proficiency with HEP, 04/19/21     Long Term Goals: To be accomplished in 4 weeks:  1. Patient's pain level will be 3-4/10 with activity in order to improve patient's ability to perform normal ADLs. At Last PN: Met, Over the last week pain is 3-4/10, 05/05/21  2. Patient will demonstrate cervical flexion 15, extension 30, right side bend 25, left side bend 25, right rotation 50, left rotation 50 AROM to increase ease of ADLs. At Last PN: Progressing:  AROM C/S F;exion 18; Extension; 22; Right SB 25; Left SB 20; Right Rotation 48; Left Rotation 45, 05/05/21  Current: Progressing, Flexion AROM 15 deg, Extension AROM 18 deg, Left Sidebend 20 deg, Right Sidebend 25 deg, Left Rotation 40 deg, Right Rotation 42 deg, 6/7/2021  3. Patient will increase FOTO score to 57 to indicate increased functional mobility. At Last PN: Met, FOTO = 57, 05/05/21   4. Patient report little to no difficulty with moderate activities in order to improve ADLs. At Last PN: Progressing, Reports mild difficulty, 05/05/21   Current: Remains, Mild difficulty with moderate activities, 6/7/2021  5.  Patient will report ability to sleep comfortably w/ minimal to no sleep disturbances secondary to increase in symptoms in order to demonstrate a return to PLOF  At Last PN: Reports waking up often secondary to increase in symptoms, 05/05/21  Current: Progressing, Moderate sleep disturbances reported, 6/2/2021       PLAN  []  Upgrade activities as tolerated     [x]  Continue plan of care  []  Update interventions per flow sheet       []  Discharge due to:_  []  Other:_      Yas Jones, PTA 6/7/2021  10:37 AM    Future Appointments   Date Time Provider Laureano Myrick   6/7/2021 11:00 AM Jl Wood, PTA [facility]   6/11/2021 11:00 AM Toan Mittal [facility]   6/15/2021  2:00 PM Sushma Rounds, PT [facility]   6/17/2021  1:15 PM Sushma Rounds, PT [facility]   6/22/2021  8:50 AM Marcus Miller MD [facility]

## 2021-06-11 ENCOUNTER — APPOINTMENT (OUTPATIENT)
Dept: PHYSICAL THERAPY | Age: 46
End: 2021-06-11
Attending: PHYSICAL MEDICINE & REHABILITATION
Payer: COMMERCIAL

## 2021-06-15 ENCOUNTER — APPOINTMENT (OUTPATIENT)
Dept: PHYSICAL THERAPY | Age: 46
End: 2021-06-15
Attending: PHYSICAL MEDICINE & REHABILITATION
Payer: COMMERCIAL

## 2021-06-16 NOTE — PROGRESS NOTES
[facility]              [address] [facility] [place], [address] [place]   [phone] fax    Discharge Summary  Patient name: Lucy Arroyo Start of Care: 2021   Referral source: Albert Cushing, MD : 1975   Medical/Treatment Diagnosis: Cervicalgia [M54.2]  Low back pain [M54.5]  Pain in thoracic spine [M54.6]  Payor: Bethany Varghese / Plan: [address] [place] [address] / Product Type: PPO /  Onset Date:2021     Prior Hospitalization: see medical history Provider#: [id number]   Medications: Verified on Patient Summary List    Comorbidities: Arthritis, Back Pain, BMI 31.6, GI Disease, Headaches, HBP, Sleep dysfunction. 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  Patient will become proficient in their HEP and will be compliant in performing that program.  At Last PN: Met, Reports/demonstrates full compliance/proficiency with HEP      Long Term Goals: To be accomplished in 4 weeks:  1. Patient's pain level will be 3-4/10 with activity in order to improve patient's ability to perform normal ADLs. At Last PN: Met, Over the last week pain is 3-4/10  2. Patient will demonstrate cervical flexion 15, extension 30, right side bend 25, left side bend 25, right rotation 50, left rotation 50 AROM to increase ease of ADLs. At Last PN: Progressing:  AROM C/S F;exion 18; Extension; 22; Right SB 25; Left SB 20; Right Rotation 48; Left Rotation 45  Current: Progressing, Flexion AROM 15 deg, Extension AROM 18 deg, Left Sidebend 20 deg, Right Sidebend 25 deg, Left Rotation 40 deg, Right Rotation 42 deg  3. Patient will increase FOTO score to 57 to indicate increased functional mobility. At Last PN: Met, FOTO = 57  4. Patient report little to no difficulty with moderate activities in order to improve ADLs. At Last PN: Progressing, Reports mild difficulty  Current: Remains, Mild difficulty with moderate activities  5. Patient will report ability to sleep comfortably w/ minimal to no sleep disturbances secondary to increase in symptoms in order to demonstrate a return to PLOF  At Last PN: Reports waking up often secondary to increase in symptoms  Current: Progressing, Moderate sleep disturbances reported    ASSESSMENT/RECOMMENDATIONS:    At this time patient to be discharged in accordance with patient self-request made via conversation with clinic via phone with patient subjectively reporting having undergone a procedure, specifics not shared, with patient requesting formal physical therapy discharge.  At this time patient to be discharged in accordance with self-request.    [x]Discontinue therapy: []Patient has reached or is progressing toward set goals      [x]Patient has abdicated      []Due to lack of appreciable progress towards set goals    Quinton Banuelos, PT 6/16/2021 8:50 AM

## 2021-06-17 ENCOUNTER — APPOINTMENT (OUTPATIENT)
Dept: PHYSICAL THERAPY | Age: 46
End: 2021-06-17
Attending: PHYSICAL MEDICINE & REHABILITATION
Payer: COMMERCIAL

## 2021-06-21 NOTE — PROGRESS NOTES
[facility]  [address], [place]   Phone: [phone]  Fax: [phone]        PROGRESS NOTE      HISTORY OF PRESENT ILLNESS:  The patient is a 41 y.o. male and was seen today for follow up of neck pain radiating into the right shoulder and right-sided lower back pain. He was previously seen for diffuse widespread spinal pain radiating into the bilateral shoulder blades and RUE to the wrist and RLE to the knee. Pt had onset of lower back pain x 2001 and neck pain x 2011. Onset of neck pain correlated with a cervical vertebrae compression fracture dx by chiropractor. Pt previously tolerated Neurontin 200 mg QD. He discontinued the Neurontin due to improvement in his pain. He has treated with Prednisone x 2 (most recent 1/2021). Pt previously underwent cervical spinal injections with benefit. Pt previously underwent  lumbar blocks. Most recent lumbar block was in 10/2020 by Dr. Jonel Multani. Pt reports current right rotator cuff tear. Patient denies previous spinal surgery or recent physical therapy/chiropractic care. He is not on a HEP. Pt reports his weight has fluctuated between 215-235 within 1 week. Pt denies fever or skin changes. Pt was previously followed by [facility]. Pt is a smoker. PmHx of right hip labral tear (repaired), cirrhosis of the liver. Note from Marcianne Claude, [place] dated 4/25/2019 indicating patient was seen with c/o low back pain, neck pain, shoulder pain. Pt underwent lumbar epidural 4/14/2019 with 50% relief. Treated with Mobic and Flexeril. Pt underwent cervical epidural with 50% relief. C spine MRI dated 10/15/2018 films not independently reviewed. Per report, there is mild to moderate broad based posterior disc osteophyte complex with is asymmetrically prominent on the right. The disc osteophyte complex is most promininently demonstrated in the righ tlateral to far lateral plane.  There is effacement of cerebral spinal fluid anterior to the spinal cord and lateral to the spinal cord and lateral to the spinal cord on the right due to the disc osteophyte complex. There is no convincing evidence of spinal cord deformation. There is severe right neural foraminal narrowing due to the disc osteophyte complex. there is suspected contact of the exiting right C5 nerve root. Left neural foramina is patent. L spine MRI dated 10/15/2018 films not independently reviewed. Per report, there is a small broad based disc bulge noted int he left paracentral plane there is mild indentation of the anterior aspect of the thecal sac on the left. There is mild left lateral recess narrowing without evidence of contact of the transversing left L2 nerve root. L2-3: there is a mild broad based posterior disc bulge. There is superimposed focal disc bulge noted in the left paracentral plane which extends superiorly. There is indentation on the anterior aspect sac. There is left lateral recess narrowing with suspected contact of the transvering left L3 nerve root. The right lateral recess is mildly narrowed due to a broad based posterior disc bulge. There is no evidence of contact of the transversing right L3 nerve root. Cervical spine plain films dated 3/30/2021. 2 views: AP and lateral. Revealed: Mild disc space narrowing at C5-6. Small anterior osteophytes noted at C3, C4, C5 and C6. No malalignment. No acute pathology identified. Thoracic spine plain films dated 3/30/2021. 2 views: AP and lateral. Revealed: Bridging osteophytes noted particularly in the mid to lower thoracic spine. Mild to moderate disc space narrowing at several levels. Exagerted kyphosis. No acute pathology identified. Lumbar spine plain films dated 3/30/2021. 2 views: AP and lateral. Revealed: Mild disc space narrowing at L1-2, L2-3 and L5-S1. No malalignment. No acute pathology identified.  At his last clinical appointment, I informed the patient that Neurontin is a slow acting medication and is not intended to be taken as an as-needed medication. Pt wished to discontinue the Neurontin 100 mg. He had responded well to Therapy. He continued with PT as prescribed and performed his HEP once completed. I again requested records from Dr. Stewart Rojo and [facility]"" pain management. The patient returns today and reports pain location and distribution remains unchanged. He rates his pain 3/10, previously neck pain 4/10 back pain 3/10. Pt's PT was deferred due to a hair procedure 2 weeks ago. He is compliant with his HEP. I have not received records as previously requested from Dr. Stewart Rojo. Pt reports his most recent injection was in 10/2020 by Dr. Stewart Rojo. Pt denies change in bowel or bladder habits. Note from Luis Bradley [place] dated 2/13/2019 indicating patient was seen with c/o cervical epidural 12/5/2018 with benefit. Pt received a shoulder injection on 11/21/2018 without benefit. Note from [employer] dated 3/19/2019 indicating patient underwent lumbar epidural L5-S1 on 1/19/2019. Pt underwent epidural L5-S1. Note from Luis Bradley [place] dated 4/25/2019 indicating patient was seen with c/o low back, neck, and left shoulder pain. 50% relief from lumbar block on 3/14/2019. Having relief from cervical epidural #1.  reviewed. Body mass index is 33.3 kg/m².     PCP: Unknown (Inactive)      Past Medical History:   Diagnosis Date    Cirrhosis of liver ([facility] [address]     GERD (gastroesophageal reflux disease)         Social History     Socioeconomic History    Marital status:      Spouse name: Not on file    Number of children: Not on file    Years of education: Not on file    Highest education level: Not on file   Occupational History    Not on file   Tobacco Use    Smoking status: Current Every Day Smoker     Packs/day: 1.00    Smokeless tobacco: Never Used   Vaping Use    Vaping Use: Never used   Substance and Sexual Activity    Alcohol use: Not Currently    Drug use: Never    Sexual activity: Not on file Other Topics Concern    Not on file   Social History Narrative    Not on file     Social Determinants of Health     Financial Resource Strain:     Difficulty of Paying Living Expenses:    Food Insecurity:     Worried About Running Out of Food in the Last Year:     [address] in the Last Year:    Transportation Needs:     Lack of Transportation (Medical):  Lack of Transportation (Non-Medical):    Physical Activity:     Days of Exercise per Week:     Minutes of Exercise per Session:    Stress:     Feeling of Stress :    Social Connections:     Frequency of Communication with Friends and Family:     Frequency of Social Gatherings with Friends and Family:     Attends Baptism Services:     Active Member of Clubs or Organizations:     Attends Club or Organization Meetings:     Marital Status:    Intimate Partner Violence:     Fear of Current or Ex-Partner:     Emotionally Abused:     Physically Abused:     Sexually Abused:        Current Outpatient Medications   Medication Sig Dispense Refill    finasteride (PROPECIA) 1 mg tablet take 1 tablet by mouth once daily      escitalopram oxalate (LEXAPRO) 10 mg tablet       melatonin 5 mg tablet Take 5 mg by mouth At bedtime.  multivitamin (ONE A DAY) tablet Take 1 Tab by mouth daily.  cholecalciferol (VITAMIN D3) 25 mcg (1,000 unit) cap Take 1,000 Units by mouth daily.  ibuprofen (MOTRIN) 800 mg tablet Take  by mouth.  omeprazole (PRILOSEC) 40 mg capsule Take 40 mg by mouth daily. No Known Allergies       PHYSICAL EXAMINATION    Visit Vitals  BP (!) 129/93 (BP 1 Location: Left upper arm)   Pulse 74   Temp 98.6 °F (37 °C)   Resp 17   Wt 219 lb (99.3 kg)   SpO2 96%   BMI 33.30 kg/m²       CONSTITUTIONAL: NAD, A&O x 3  SENSATION: Decreased sensation to light touch on the first digit of the LUE due to prior surgery. Otherwise, intact to light touch throughout  NEURO: Bridgette's is negative bilaterally.   RANGE OF MOTION: The patient has full passive range of motion in all four extremities. MOTOR:  Straight Leg Raise: Negative, bilateral       Shoulder AB/Flex Elbow Flex Wrist Ext Elbow Ext Wrist Flex Hand Intrin Tone   Right +4/5 +4/5 +4/5 +4/5 +4/5 +4/5 +4/5   Left +4/5 +4/5 +4/5 +4/5 +4/5 +4/5 +4/5              Hip Flex Knee Ext Knee Flex Ankle DF GTE Ankle PF Tone   Right +4/5 +4/5 +4/5 +4/5 +4/5 +4/5 +4/5   Left +4/5 +4/5 +4/5 +4/5 +4/5 +4/5 +4/5       ASSESSMENT   Diagnoses and all orders for this visit:    1. Neck pain    2. Thoracic spine pain    3. Low back pain at multiple sites    4. Cervical spondylosis without myelopathy    5. Cervical neuritis    6. DDD (degenerative disc disease), cervical    7. Thoracic spondylosis without myelopathy    8. DDD (degenerative disc disease), thoracic    9. Lumbar neuritis    10. DDD (degenerative disc disease), lumbar        IMPRESSION AND PLAN:  Patient returns to the office today with c/o neck pain radiating into the right shoulder and right-sided lower back pain. Multiple treatment options were discussed. He did not think his remaining pain complaints were severe enough to warrant additional workup/treatment at this time. I encouraged him to continue to perform his daily HEP. Patient is neurologically intact. I will see the patient back prn. Written by Valente Peterson, as dictated by Dori Almeida MD  I examined the patient, reviewed and agree with the note.

## 2021-06-22 ENCOUNTER — OFFICE VISIT (OUTPATIENT)
Dept: ORTHOPEDIC SURGERY | Age: 46
End: 2021-06-22
Payer: COMMERCIAL

## 2021-06-22 VITALS
TEMPERATURE: 98.6 F | OXYGEN SATURATION: 96 % | DIASTOLIC BLOOD PRESSURE: 93 MMHG | SYSTOLIC BLOOD PRESSURE: 129 MMHG | BODY MASS INDEX: 33.3 KG/M2 | RESPIRATION RATE: 17 BRPM | WEIGHT: 219 LBS | HEART RATE: 74 BPM

## 2021-06-22 DIAGNOSIS — M54.6 THORACIC SPINE PAIN: ICD-10-CM

## 2021-06-22 DIAGNOSIS — M54.12 CERVICAL NEURITIS: ICD-10-CM

## 2021-06-22 DIAGNOSIS — M51.36 DDD (DEGENERATIVE DISC DISEASE), LUMBAR: ICD-10-CM

## 2021-06-22 DIAGNOSIS — M54.50 LOW BACK PAIN AT MULTIPLE SITES: ICD-10-CM

## 2021-06-22 DIAGNOSIS — M47.812 CERVICAL SPONDYLOSIS WITHOUT MYELOPATHY: ICD-10-CM

## 2021-06-22 DIAGNOSIS — M54.16 LUMBAR NEURITIS: ICD-10-CM

## 2021-06-22 DIAGNOSIS — M54.2 NECK PAIN: Primary | ICD-10-CM

## 2021-06-22 DIAGNOSIS — M51.34 DDD (DEGENERATIVE DISC DISEASE), THORACIC: ICD-10-CM

## 2021-06-22 DIAGNOSIS — M50.30 DDD (DEGENERATIVE DISC DISEASE), CERVICAL: ICD-10-CM

## 2021-06-22 DIAGNOSIS — M47.814 THORACIC SPONDYLOSIS WITHOUT MYELOPATHY: ICD-10-CM

## 2021-06-22 PROCEDURE — 99213 OFFICE O/P EST LOW 20 MIN: CPT | Performed by: PHYSICAL MEDICINE & REHABILITATION

## 2021-06-22 RX ORDER — FINASTERIDE 1 MG/1
TABLET, FILM COATED ORAL
COMMUNITY
Start: 2021-06-09

## 2022-02-13 ENCOUNTER — TRANSCRIBE ORDER (OUTPATIENT)
Dept: SCHEDULING | Age: 47
End: 2022-02-13

## 2022-02-13 DIAGNOSIS — Z12.11 ENCOUNTER FOR SCREENING COLONOSCOPY: ICD-10-CM

## 2022-02-13 DIAGNOSIS — K21.9 GERD (GASTROESOPHAGEAL REFLUX DISEASE): Primary | ICD-10-CM

## 2022-02-13 DIAGNOSIS — R14.0 BLOATING: ICD-10-CM

## 2022-03-24 ENCOUNTER — HOSPITAL ENCOUNTER (OUTPATIENT)
Dept: GENERAL RADIOLOGY | Age: 47
Discharge: HOME | End: 2022-03-24
Attending: INTERNAL MEDICINE
Payer: COMMERCIAL

## 2022-03-24 DIAGNOSIS — R14.0 BLOATING: ICD-10-CM

## 2022-03-24 DIAGNOSIS — Z12.11 ENCOUNTER FOR SCREENING COLONOSCOPY: ICD-10-CM

## 2022-03-24 DIAGNOSIS — K21.9 GERD (GASTROESOPHAGEAL REFLUX DISEASE): ICD-10-CM

## 2022-03-24 PROCEDURE — 74011000250 HC RX REV CODE- 250: Performed by: INTERNAL MEDICINE

## 2022-03-24 PROCEDURE — 74248 X-RAY SM INT F-THRU STD: CPT

## 2022-03-24 RX ADMIN — BARIUM SULFATE 176 G: 960 POWDER, FOR SUSPENSION ORAL at 11:00

## 2022-03-24 RX ADMIN — ANTACID/ANTIFLATULENT 4 G: 380; 550; 10; 10 GRANULE, EFFERVESCENT ORAL at 11:00

## 2022-03-24 RX ADMIN — BARIUM SULFATE 135 ML: 980 POWDER, FOR SUSPENSION ORAL at 11:00
